# Patient Record
Sex: FEMALE | Race: ASIAN | Employment: FULL TIME | URBAN - METROPOLITAN AREA
[De-identification: names, ages, dates, MRNs, and addresses within clinical notes are randomized per-mention and may not be internally consistent; named-entity substitution may affect disease eponyms.]

---

## 2017-10-19 ENCOUNTER — OFFICE VISIT (OUTPATIENT)
Dept: ENDOCRINOLOGY | Age: 57
End: 2017-10-19

## 2017-10-19 VITALS
HEART RATE: 81 BPM | BODY MASS INDEX: 20.73 KG/M2 | DIASTOLIC BLOOD PRESSURE: 74 MMHG | HEIGHT: 63 IN | OXYGEN SATURATION: 99 % | WEIGHT: 117 LBS | RESPIRATION RATE: 16 BRPM | SYSTOLIC BLOOD PRESSURE: 99 MMHG

## 2017-10-19 DIAGNOSIS — E78.2 MIXED HYPERLIPIDEMIA: ICD-10-CM

## 2017-10-19 DIAGNOSIS — Z79.4 TYPE 2 DIABETES MELLITUS WITH BOTH EYES AFFECTED BY MODERATE NONPROLIFERATIVE RETINOPATHY WITHOUT MACULAR EDEMA, WITH LONG-TERM CURRENT USE OF INSULIN (HCC): Primary | ICD-10-CM

## 2017-10-19 DIAGNOSIS — E11.3393 TYPE 2 DIABETES MELLITUS WITH BOTH EYES AFFECTED BY MODERATE NONPROLIFERATIVE RETINOPATHY WITHOUT MACULAR EDEMA, WITH LONG-TERM CURRENT USE OF INSULIN (HCC): Primary | ICD-10-CM

## 2017-10-19 RX ORDER — LISINOPRIL 2.5 MG/1
TABLET ORAL
COMMUNITY
Start: 2017-10-06 | End: 2019-01-08 | Stop reason: SDUPTHER

## 2017-10-19 RX ORDER — METFORMIN HYDROCHLORIDE 1000 MG/1
TABLET ORAL
COMMUNITY
Start: 2017-10-18 | End: 2017-10-19 | Stop reason: SDUPTHER

## 2017-10-19 RX ORDER — METFORMIN HYDROCHLORIDE 1000 MG/1
1000 TABLET ORAL 2 TIMES DAILY WITH MEALS
Qty: 60 TAB | Refills: 11 | Status: SHIPPED | OUTPATIENT
Start: 2017-10-19 | End: 2018-04-02 | Stop reason: SDUPTHER

## 2017-10-19 RX ORDER — PEN NEEDLE, DIABETIC 30 GX3/16"
NEEDLE, DISPOSABLE MISCELLANEOUS DAILY
Qty: 1 PACKAGE | Refills: 11 | Status: SHIPPED | OUTPATIENT
Start: 2017-10-19 | End: 2021-11-18

## 2017-10-19 RX ORDER — INSULIN GLARGINE 100 [IU]/ML
INJECTION, SOLUTION SUBCUTANEOUS
Qty: 1 PEN | Refills: 0 | Status: SHIPPED | COMMUNITY
Start: 2017-10-19 | End: 2017-10-19 | Stop reason: SDUPTHER

## 2017-10-19 RX ORDER — GLIPIZIDE 5 MG/1
TABLET, FILM COATED, EXTENDED RELEASE ORAL
COMMUNITY
Start: 2017-09-17 | End: 2018-05-18 | Stop reason: SDUPTHER

## 2017-10-19 RX ORDER — ATORVASTATIN CALCIUM 40 MG/1
TABLET, FILM COATED ORAL
COMMUNITY
Start: 2017-10-18 | End: 2018-05-18 | Stop reason: SDUPTHER

## 2017-10-19 RX ORDER — INSULIN GLARGINE 100 [IU]/ML
INJECTION, SOLUTION SUBCUTANEOUS
Qty: 5 PEN | Refills: 2 | Status: SHIPPED | OUTPATIENT
Start: 2017-10-19 | End: 2018-05-18 | Stop reason: SDUPTHER

## 2017-10-19 NOTE — PROGRESS NOTES
Endocrinology New Patient Visit    Chief Complaint: Type 2 diabetes, uncontrolled    Referring provider: ANJALI Nation ([facility])  Primary Care Provider: Raiza Clemons MD    History of Present Illness: Regina Escobar is a 56 y.o. female who was referred for evaluation of uncontrolled type 2 diabetes mellitus with last A1c 10.3% in July. She was diagnosed with diabetes two years ago. She has only been on oral medications. Current glycemic medication regimen is metformin 500mg BID, glucotrol XL 5mg daily, and Tradjenta 5mg daily. Home blood glucose monitoring frequency: not often, has a meter but doesn't use it. Fasting POC glucose this morning is 238 . The patient has not had symptoms of hypoglycemia that she knows of. Known complications include moderate nonproliferative retinopathy. Last eye exam was in Sept 2016. Labs in July were notable for total chol 197, , , HDL 49; normal renal function (), normal AST/ALT. Takes atorvastatin and lisinopril. She has attended diabetes education but does not necessarily try to restrict dietary carbohydrate intake, eats a lot of bread and rice. Eats 3 meals/day and does not snack often. Only drinks water.  Typical meals are as follows:  Breakfast: bagel or bread  Lunch and Dinner: rice with meat and vegetable    Review of Systems   General ROS: negative  Ophthalmic ROS: positive for - decreased vision  ENT ROS: negative  Endocrine ROS: negative for - polydipsia/polyuria  Respiratory ROS: no cough, shortness of breath, or wheezing  Cardiovascular ROS: no chest pain or dyspnea on exertion  Gastrointestinal ROS: no abdominal pain, change in bowel habits, or black or bloody stools  Genito-Urinary ROS: no dysuria, trouble voiding, or hematuria  Musculoskeletal ROS: negative  Neurological ROS: positive for - numbness/tingling  Dermatological ROS: negative    Problem List:  Patient Active Problem List   Diagnosis Code    [facility] E11.9 Past Medical History:  Past Medical History:   Diagnosis Date    Diabetes ([facility]     Hyperlipidemia        Past Surgical History:  History reviewed. No pertinent surgical history. Social History:  Social History     Social History    Marital status:      Spouse name: N/A    Number of children: N/A    Years of education: N/A     Occupational History    Not on file. Social History Main Topics    Smoking status: Never Smoker    Smokeless tobacco: Never Used    Alcohol use No    Drug use: No    Sexual activity: Not on file     Other Topics Concern    Not on file     Social History Narrative    No narrative on file       Family History:  No family history on file. Medications:     Current Outpatient Prescriptions:     atorvastatin (LIPITOR) 40 mg tablet, , Disp: , Rfl:     glipiZIDE SR (GLUCOTROL XL) 5 mg CR tablet, , Disp: , Rfl:     metFORMIN (GLUCOPHAGE) 1,000 mg tablet, , Disp: , Rfl:     lisinopril (PRINIVIL, ZESTRIL) 2.5 mg tablet, , Disp: , Rfl:     Allergies:  No Known Allergies    Physical Examination:  Blood pressure 99/74, pulse 81, resp. rate 16, height 5' 3\" (1.6 m), weight 117 lb (53.1 kg), SpO2 99 %. Gen: no acute distress  HEENT: mucous membranes moist, normocephalic, non traumatic  Thyroid: no enlargement or nodules noted  CAD: normal rate, regular rhythm. No murmur rubs or gallops  PULM: clear to ausculation, no wheezes, rhonchis or rales. GI: soft non tender, non distended. EXT: no clubbing, cyanosis or edema  Neuro: grossly non focal, normal DTRs  Psych: pleasant, good insight into medical hx  Skin: warm, dry      Clinical Data Review: There are no results available in [facility]. Pertinent lab results from outside records are transcribed in HPI and scanned into the medical record. Assessment and Plan:  Patient is a 56 y.o. female here for uncontrolled type 2 diabetes on three oral agents.  Given the degree of A1c elevation and her dx of moderate nonproliferative retinopathy, I recommend adding basal insulin today. I personally taught her how to inject with an insulin pen and she administered her first shot in clinic today. Once blood sugars are better controlled, I will probably switch her sulfonylurea to SGLT2i, but would prefer that A1c be under 9% first. We also discussed the importance of good blood pressure and glycemic control to further reduce the risks of microvascular and macrovascular complications. We discussed how to recognize and treat hypoglycemia. She will notify me in between appointments for hypoglycemia or persistent hyperglycemia and has my contact information. 1. 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 Mixed hyperlipidemia       Glycemic Medication Changes:  - start Lantus 12 units Qam  - increase metformin to 1000mg BID  - continue Tradjenta and glipizide XL  - check BG 2x/day  - encouraged her to purchase glucose tabs OTC  - provided detailed written instructions     DM Health Maintenance: pertinent items updated in HM tab  A1c: repeat at next visit  Cv/Lipids: on atorvastatin, lipids UTD  BP/Renal: BP at goal, on ACEi, microalbumin: check next visit  Vaccines: per PCP  Podiatry: no active issues, encouraged well-fitting footwear and daily inspection  Neuro: update foot exam at next visit  Ophtho: yearly eye exam recommended - last visit Sept 2016, dx moderate nonproliferative retinopathy  Diet and exercise: discussed healthy eating and exercise recommendations, particularly reduction in dietary carbohydrates    I spent 60 minutes with the patient today and > 50% of the time was spent counseling the patient about diabetes management including medication options and dietary modification. Patient verbalized an understanding and will return to clinic in 3 months.  Thank you for the opportunity to participate in this patient's care.     Mark Jerome MD  [facility]  [facility]

## 2017-10-19 NOTE — MR AVS SNAPSHOT
Visit Information Date & Time Provider Department Dept. Phone Encounter #  
 10/19/2017  8:30 AM Nicole Bello, [facility] [phone] Upcoming Health Maintenance Date Due Hepatitis C Screening 1960 DTaP/Tdap/Td series (1 - Tdap) 2/1/1983 PAP AKA CERVICAL CYTOLOGY 2/1/1983 BREAST CANCER SCRN MAMMOGRAM 2/1/2012 FOBT Q 1 YEAR AGE 50-75 2/1/2012 INFLUENZA AGE 9 TO ADULT 8/1/2017 Allergies as of 10/19/2017  Review Complete On: 10/19/2017 By: Trini Cohn No Known Allergies Current Immunizations  Never Reviewed No immunizations on file. Not reviewed this visit Vitals BP Pulse Resp Height(growth percentile) Weight(growth percentile) SpO2  
 99/74 81 16 5' 3\" (1.6 m) 117 lb (53.1 kg) 99% BMI OB Status Smoking Status 20.73 kg/m2 Postmenopausal Never Smoker Vitals History BMI and BSA Data Body Mass Index Body Surface Area 20.73 kg/m 2 1.54 m 2 Preferred Pharmacy Pharmacy Name Phone [facility] [address] [place] [address] Your Updated Medication List  
  
   
This list is accurate as of: 10/19/17  9:20 AM.  Always use your most recent med list.  
  
  
  
  
 atorvastatin 40 mg tablet Commonly known as:  LIPITOR  
  
 glipiZIDE SR 5 mg CR tablet Commonly known as:  GLUCOTROL XL  
  
 insulin glargine 100 unit/mL (3 mL) Inpn Commonly known as:  LANTUS SOLOSTAR Inject 12 units daily Insulin Needles (Disposable) 31 gauge x 5/16\" Ndle  
by SubCUTAneous route daily. lisinopril 2.5 mg tablet Commonly known as:  PRINIVIL, ZESTRIL  
  
 metFORMIN 1,000 mg tablet Commonly known as:  GLUCOPHAGE Take 1 Tab by mouth two (2) times daily (with meals). TRADJENTA 5 mg tablet Generic drug:  linagliptin Take 5 mg by mouth daily. Prescriptions Sent to Pharmacy Refills insulin glargine (LANTUS SOLOSTAR) 100 unit/mL (3 mL) inpn 2 Sig: Inject 12 units daily Class: Normal  
 Pharmacy: [address] [place] [address],[address], [facility] #: [phone] Insulin Needles, Disposable, 31 gauge x 5/16\" ndle 11 Sig: by SubCUTAneous route daily. Class: Normal  
 Pharmacy: [address] [place] [address],[address], [facility]  Ph #: [phone] Route: SubCUTAneous  
 metFORMIN (GLUCOPHAGE) 1,000 mg tablet 11 Sig: Take 1 Tab by mouth two (2) times daily (with meals). Class: Normal  
 Pharmacy: [address] [place] [address],[address], [facility]  #: [phone] Route: Oral  
  
Patient Instructions Diabetes Instructions: 
- start LANTUS INSULIN 12 units every morning 
- take metformin 1000mg twice a day 
- take glipizide XL once a day 
- take Tradjenta once a day Check blood sugar at least TWICE a day: every morning when you wake up and at bedtime. Keep a record of your values and bring this or your glucometer with you to your next visit. Goal blood sugars: 80 to 180 Notify me if you have blood sugars less than 80 Diet instructions: 
Reduce the amount of carbohydrates in your diet. This means not just avoiding sweets, sodas, or desserts but also reducing the amount of bread, pasta, rice, potatoes, corn, and crackers that you eat. Do not have more than one serving of carbs per meal (for example: do not eat a sandwich and potato chips in the same meal). Focus on eating mostly protein (meat, poultry, fish, shellfish, eggs), healthy fats (avocados, nuts, cheese, olive or coconut oil) and non-starchy vegetables (greens, carrots, tomatoes, bell peppers, broccoli, brussels sprouts, green beans, etc). If you fill yourself up with these foods, you won't even want the carbs. Introducing [facility] & HEALTH SERVICES!    
 [address] [place] [address] introduces MyChart patient portal. Now you can access parts of your medical record, email your doctor's office, and request medication refills online. 1. In your internet browser, go to [email or web address] 2. Click on the First Time User? Click Here link in the Sign In box. You will see the New Member Sign Up page. 3. Enter your [employer] Access Code exactly as it appears below. You will not need to use this code after youve completed the sign-up process. If you do not sign up before the expiration date, you must request a new code. · [employer] Access Code: [email or web address] Expires: 1/17/2018  9:15 AM 
 
4. Enter the last four digits of your Social Security Number (xxxx) and Date of Birth (mm/dd/yyyy) as indicated and click Submit. You will be taken to the next sign-up page. 5. Create a [employer] ID. This will be your [employer] login ID and cannot be changed, so think of one that is secure and easy to remember. 6. Create a [employer] password. You can change your password at any time. 7. Enter your Password Reset Question and Answer. This can be used at a later time if you forget your password. 8. Enter your e-mail address. You will receive e-mail notification when new information is available in [address] 9. Click Sign Up. You can now view and download portions of your medical record. 10. Click the Download Summary menu link to download a portable copy of your medical information. If you have questions, please visit the Frequently Asked Questions section of the [employer] website. Remember, [employer] is NOT to be used for urgent needs. For medical emergencies, dial 911. Now available from your iPhone and Android! Please provide this summary of care documentation to your next provider. Your primary care clinician is listed as Jamas Hipps. If you have any questions after today's visit, please call [phone].

## 2018-01-26 ENCOUNTER — OFFICE VISIT (OUTPATIENT)
Dept: ENDOCRINOLOGY | Age: 58
End: 2018-01-26

## 2018-01-26 VITALS
OXYGEN SATURATION: 99 % | SYSTOLIC BLOOD PRESSURE: 111 MMHG | HEIGHT: 63 IN | DIASTOLIC BLOOD PRESSURE: 71 MMHG | WEIGHT: 103.6 LBS | BODY MASS INDEX: 18.36 KG/M2 | RESPIRATION RATE: 16 BRPM | HEART RATE: 86 BPM

## 2018-01-26 DIAGNOSIS — E78.2 MIXED HYPERLIPIDEMIA: ICD-10-CM

## 2018-01-26 DIAGNOSIS — Z79.4 TYPE 2 DIABETES MELLITUS WITH BOTH EYES AFFECTED BY MODERATE NONPROLIFERATIVE RETINOPATHY WITHOUT MACULAR EDEMA, WITH LONG-TERM CURRENT USE OF INSULIN (HCC): Primary | ICD-10-CM

## 2018-01-26 DIAGNOSIS — E11.3393 TYPE 2 DIABETES MELLITUS WITH BOTH EYES AFFECTED BY MODERATE NONPROLIFERATIVE RETINOPATHY WITHOUT MACULAR EDEMA, WITH LONG-TERM CURRENT USE OF INSULIN (HCC): Primary | ICD-10-CM

## 2018-01-26 RX ORDER — ALBUTEROL SULFATE 90 UG/1
AEROSOL, METERED RESPIRATORY (INHALATION)
COMMUNITY
Start: 2018-01-19 | End: 2021-11-18

## 2018-01-26 RX ORDER — METHYLPREDNISOLONE 4 MG/1
TABLET ORAL
COMMUNITY
Start: 2018-01-19 | End: 2018-01-26 | Stop reason: ALTCHOICE

## 2018-01-26 NOTE — MR AVS SNAPSHOT
[address] [place] [address] 
[phone] Patient: Susan Montoya MRN: [id number] IMB:[birth year] Visit Information Date & Time Provider Department Dept. Phone Encounter #  
 1/26/2018 10:10 AM Berta Sawyer MD [facility] [phone] Follow-up Instructions Return in about 4 months (around 5/26/2018). Upcoming Health Maintenance Date Due Hepatitis C Screening 1960 HEMOGLOBIN A1C Q6M 1962 LIPID PANEL Q1 1962 FOOT EXAM Q1 2/1/1972 MICROALBUMIN Q1 2/1/1972 Pneumococcal 19-64 Medium Risk (1 of 1 - PPSV23) 2/1/1981 DTaP/Tdap/Td series (1 - Tdap) 2/1/1983 PAP AKA CERVICAL CYTOLOGY 2/1/1983 BREAST CANCER SCRN MAMMOGRAM 2/1/2012 FOBT Q 1 YEAR AGE 50-75 2/1/2012 Influenza Age 5 to Adult 8/1/2017 EYE EXAM RETINAL OR DILATED Q1 10/19/2018 Allergies as of 1/26/2018  Review Complete On: 1/26/2018 By: Alfredo Villavicencio No Known Allergies Current Immunizations  Never Reviewed No immunizations on file. Not reviewed this visit You Were Diagnosed With   
  
 Codes Comments Type 2 diabetes mellitus with both eyes affected by moderate nonproliferative retinopathy without macular edema, with long-term current use of insulin ([facility]    -  Primary ICD-10-CM: U12.4676, Z79.4 ICD-9-CM: 250.50, 362.05, V58.67 Mixed hyperlipidemia     ICD-10-CM: E78.2 ICD-9-CM: 272.2 Vitals BP Pulse Resp Height(growth percentile) Weight(growth percentile) SpO2  
 111/71 86 16 5' 3\" (1.6 m) 103 lb 9.6 oz (47 kg) 99% BMI OB Status Smoking Status 18.35 kg/m2 Postmenopausal Never Smoker Vitals History BMI and BSA Data Body Mass Index Body Surface Area  
 18.35 kg/m 2 1.45 m 2 Preferred Pharmacy Pharmacy Name Phone [address] [facility], [address] [place] [address]  [phone] Your Updated Medication List  
  
   
This list is accurate as of: 1/26/18 10:27 AM.  Always use your most recent med list.  
  
  
  
  
 atorvastatin 40 mg tablet Commonly known as:  LIPITOR  
  
 glipiZIDE SR 5 mg CR tablet Commonly known as:  GLUCOTROL XL  
  
 glucose blood VI test strips strip Commonly known as:  Kenia Kong Check BG 2-3 times/day  
  
 insulin glargine 100 unit/mL (3 mL) Inpn Commonly known as:  LANTUS SOLOSTAR Inject 12 units daily Insulin Needles (Disposable) 31 gauge x 5/16\" Ndle  
by SubCUTAneous route daily. linagliptin 5 mg tablet Commonly known as:  Osei Burow Take 1 Tab by mouth daily. lisinopril 2.5 mg tablet Commonly known as:  PRINIVIL, ZESTRIL  
  
 metFORMIN 1,000 mg tablet Commonly known as:  GLUCOPHAGE Take 1 Tab by mouth two (2) times daily (with meals). PROAIR HFA 90 mcg/actuation inhaler Generic drug:  albuterol Prescriptions Sent to Pharmacy Refills  
 glucose blood VI test strips (ONETOUCH VERIO) strip 11 Sig: Check BG 2-3 times/day Class: Normal  
 Pharmacy: [address] [place] [address] [address], [facility] Ph #: [phone]  
 linagliptin (TRADJENTA) 5 mg tablet 5 Sig: Take 1 Tab by mouth daily. Class: Normal  
 Pharmacy: [address] [place] [address] [address] [facility], [facility] Ph #: [phone] Route: Oral  
  
We Performed the Following BASIC METABOLIC PANEL (7) [06674 CPT(R)] HEMOGLOBIN A1C WITH EAG [68958 CPT(R)]  DIABETES FOOT EXAM [7 Custom] LIPID PANEL [91980 CPT(R)] MICROALBUMIN, UR, RAND W/ MICROALBUMIN/CREA RATIO A5737527 CPT(R)] Follow-up Instructions Return in about 4 months (around 5/26/2018). Patient Instructions Diabetes Instructions: 
- take LANTUS INSULIN 10 units every morning 
- take metformin 1000mg twice a day 
- take glipizide XL once a day 
- take Tradjenta 5mg once a day Check blood sugar at least TWICE a day: every morning when you wake up and at bedtime. Keep a record of your values and bring this or your glucometer with you to your next visit. Goal blood sugars: 80 to 180 Notify me if you have blood sugars less than 80 Diet instructions: 
Reduce the amount of carbohydrates in your diet. This means not just avoiding sweets, sodas, or desserts but also reducing the amount of bread, pasta, rice, potatoes, corn, and crackers that you eat. Do not have more than one serving of carbs per meal (for example: do not eat a sandwich and potato chips in the same meal). Focus on eating mostly protein (meat, poultry, fish, shellfish, eggs), healthy fats (avocados, nuts, cheese, olive or coconut oil) and non-starchy vegetables (greens, carrots, tomatoes, bell peppers, broccoli, brussels sprouts, green beans, etc). If you fill yourself up with these foods, you won't even want the carbs. Introducing [facility] & HEALTH SERVICES! Guy Daniels introduces [employer] patient portal. Now you can access parts of your medical record, email your doctor's office, and request medication refills online. 1. In your internet browser, go to [email or web address] 2. Click on the First Time User? Click Here link in the Sign In box. You will see the New Member Sign Up page. 3. Enter your [employer] Access Code exactly as it appears below. You will not need to use this code after youve completed the sign-up process. If you do not sign up before the expiration date, you must request a new code. · [employer] Access Code: [email or web address] Expires: 4/26/2018 10:26 AM 
 
4. Enter the last four digits of your Social Security Number (xxxx) and Date of Birth (mm/dd/yyyy) as indicated and click Submit. You will be taken to the next sign-up page. 5. Create a [employer] ID. This will be your [employer] login ID and cannot be changed, so think of one that is secure and easy to remember. 6. Create a [employer] password. You can change your password at any time. 7. Enter your Password Reset Question and Answer. This can be used at a later time if you forget your password. 8. Enter your e-mail address. You will receive e-mail notification when new information is available in [address] 9. Click Sign Up. You can now view and download portions of your medical record. 10. Click the Download Summary menu link to download a portable copy of your medical information. If you have questions, please visit the Frequently Asked Questions section of the [employer] website. Remember, [employer] is NOT to be used for urgent needs. For medical emergencies, dial 911. Now available from your iPhone and Android! Please provide this summary of care documentation to your next provider. Your primary care clinician is listed as Sabas Li. If you have any questions after today's visit, please call [phone].

## 2018-01-26 NOTE — PROGRESS NOTES
Endocrinology Visit    Chief Complaint: Type 2 diabetes    History of Present Illness: Gabbi Waters is a 56 y.o. female who returns for uncontrolled type 2 diabetes mellitus with last A1c 10.3% in July. I saw her in initial consultation in October at which time I added basal insulin to her oral medications. In the interim, she reports improvement in blood sugars initially but has been unable to check lately because her meter is not working and she ran out of test strips. She has also been sick with the flu for the past week - symptoms are resolving and she denies fevers/chills. Weight is down 14 lbs. Current glycemic medication regimen is metformin 500mg BID, glucotrol XL 5mg daily, and Lantus 12 units daily. She stopped taking Lantus for a period of time because a friend told her it would harm her liver. She has been taking it on a daily basis recently but did not take it this morning. Also ran out of 1010data so has not been taking this for the past month or so. Home blood glucose monitoring frequency: was checking 1-2 times/day in Nov. Brings in log with values that range from 66-200s, avg appears to be around 170 (see scanned in log). POC glucose this morning is 143 (ate breakfast consisting of an egg and rice about 4 hours ago). Denies hypoglycemia. Known complications include moderate nonproliferative retinopathy. Last eye exam was in Oct 2017. Labs in July 2017 were notable for total chol 197, , , HDL 49; normal renal function (), normal AST/ALT. Takes atorvastatin and lisinopril. She has attended diabetes education but does not necessarily try to restrict dietary carbohydrate intake, eats a lot of bread and rice. Eats 3 meals/day and does not snack often. Only drinks water.  Typical meals are as follows:  Breakfast: bagel or bread, eggs and rice  Lunch and Dinner: rice with meat and vegetables    Review of Systems as above, otherwise a 7 pt review is negative    Problem List:  Patient Active Problem List   Diagnosis Code    Diabetes ([facility] E11.9    Mixed hyperlipidemia E78.2       Past Medical History:    Past Medical History:   Diagnosis Date    Diabetes ([facility]     Hyperlipidemia        Past Surgical History:  History reviewed. No pertinent surgical history. Social History:  Social History     Social History    Marital status:      Spouse name: N/A    Number of children: N/A    Years of education: N/A     Occupational History    Not on file. Social History Main Topics    Smoking status: Never Smoker    Smokeless tobacco: Never Used    Alcohol use No    Drug use: No    Sexual activity: Not on file     Other Topics Concern    Not on file     Social History Narrative       Family History:  No family history on file. Medications:     Current Outpatient Prescriptions:     PROAIR HFA 90 mcg/actuation inhaler, , Disp: , Rfl:     atorvastatin (LIPITOR) 40 mg tablet, , Disp: , Rfl:     lisinopril (PRINIVIL, ZESTRIL) 2.5 mg tablet, , Disp: , Rfl:     glipiZIDE SR (GLUCOTROL XL) 5 mg CR tablet, , Disp: , Rfl:     insulin glargine (LANTUS SOLOSTAR) 100 unit/mL (3 mL) inpn, Inject 12 units daily, Disp: 5 Pen, Rfl: 2    Insulin Needles, Disposable, 31 gauge x 5/16\" ndle, by SubCUTAneous route daily. , Disp: 1 Package, Rfl: 11    metFORMIN (GLUCOPHAGE) 1,000 mg tablet, Take 1 Tab by mouth two (2) times daily (with meals). , Disp: 60 Tab, Rfl: 11    methylPREDNISolone (MEDROL DOSEPACK) 4 mg tablet, , Disp: , Rfl:     linagliptin (TRADJENTA) 5 mg tablet, Take 5 mg by mouth daily. , Disp: , Rfl:     Allergies:  No Known Allergies    Physical Examination:  Blood pressure 111/71, pulse 86, resp. rate 16, height 5' 3\" (1.6 m), weight 103 lb 9.6 oz (47 kg), SpO2 99 %. Gen: no acute distress  HEENT: mucous membranes moist, normocephalic, non traumatic  Thyroid: no enlargement or nodules noted  CAD: normal rate, regular rhythm.  No murmur rubs or gallops  PULM: clear to ausculation, no wheezes, rhonchis or rales. EXT: no edema, mild calluses on feet  Neuro: grossly non focal, normal DTRs  Psych: pleasant, good insight into medical hx  Skin: warm, dry    Diabetic foot exam:   Left: Filament test normal sensation with micro filament   Pulse DP: 2+ (normal)   Pulse PT: 2+ (normal)   Deformities: None  Right: Filament test normal sensation with micro filament   Pulse DP: 2+ (normal)   Pulse PT: 2+ (normal)   Deformities: None      Clinical Data Review: There are no results available in [facility]. Pertinent lab results from outside records are transcribed in [facility]PI and scanned into the medical record. Assessment and Plan:  Patient is a 56 y.o. female here for type 2 diabetes, control of which appears to be improving after adding basal insulin to her oral agents. She has not been on all four prescribed agents consistently for the past 1-2 months, also has not been able to self-monitor glucose values. Will resume DPP4i and reduce insulin dose slightly. Educated pt on appropriate daily administration of her medications.      Glycemic Medication Changes:  - decrease Lantus to 10 units Qam  - continue metformin 1000mg BID  - continue Tradjenta and glipizide XL  - check BG 2x/day  - provided detailed written instructions     DM Health Maintenance: pertinent items updated in HM tab  A1c: repeat today  Cv/Lipids: on atorvastatin, update lipid panel today  BP/Renal: BP at goal, on ACEi, microalbumin: check today  Vaccines: per PCP  Podiatry: no active issues, encouraged well-fitting footwear and daily inspection  Neuro: foot exam completed today - no evidence of neuropathy  Ophtho: yearly eye exam UTD - last visit Oct 2017, dx moderate nonproliferative retinopathy  Diet and exercise: discussed healthy eating and exercise recommendations, particularly reduction in dietary carbohydrates    I spent 25 minutes with the patient today and > 50% of the time was spent counseling the patient about diabetes management including medication options and dietary modification. Patient verbalized an understanding and will return to clinic in 4 months. Thank you for the opportunity to participate in this patient's care.     La Boss MD  [facility]  [facility]

## 2018-01-26 NOTE — PATIENT INSTRUCTIONS
Diabetes Instructions:  - take LANTUS INSULIN 10 units every morning  - take metformin 1000mg twice a day  - take glipizide XL once a day  - take Tradjenta 5mg once a day    Check blood sugar at least TWICE a day: every morning when you wake up and at bedtime. Keep a record of your values and bring this or your glucometer with you to your next visit. Goal blood sugars: 80 to 180  Notify me if you have blood sugars less than 80      Diet instructions:  Reduce the amount of carbohydrates in your diet. This means not just avoiding sweets, sodas, or desserts but also reducing the amount of bread, pasta, rice, potatoes, corn, and crackers that you eat. Do not have more than one serving of carbs per meal (for example: do not eat a sandwich and potato chips in the same meal). Focus on eating mostly protein (meat, poultry, fish, shellfish, eggs), healthy fats (avocados, nuts, cheese, olive or coconut oil) and non-starchy vegetables (greens, carrots, tomatoes, bell peppers, broccoli, brussels sprouts, green beans, etc). If you fill yourself up with these foods, you won't even want the carbs.

## 2018-04-02 RX ORDER — METFORMIN HYDROCHLORIDE 1000 MG/1
1000 TABLET ORAL 2 TIMES DAILY WITH MEALS
Qty: 180 TAB | Refills: 1 | Status: SHIPPED | OUTPATIENT
Start: 2018-04-02 | End: 2018-05-18 | Stop reason: SDUPTHER

## 2018-05-18 ENCOUNTER — OFFICE VISIT (OUTPATIENT)
Dept: ENDOCRINOLOGY | Age: 58
End: 2018-05-18

## 2018-05-18 VITALS
BODY MASS INDEX: 19.1 KG/M2 | SYSTOLIC BLOOD PRESSURE: 125 MMHG | HEIGHT: 63 IN | HEART RATE: 92 BPM | WEIGHT: 107.8 LBS | DIASTOLIC BLOOD PRESSURE: 74 MMHG

## 2018-05-18 DIAGNOSIS — E11.9 TYPE 2 DIABETES MELLITUS WITHOUT COMPLICATION, WITH LONG-TERM CURRENT USE OF INSULIN (HCC): Primary | ICD-10-CM

## 2018-05-18 DIAGNOSIS — Z79.4 TYPE 2 DIABETES MELLITUS WITHOUT COMPLICATION, WITH LONG-TERM CURRENT USE OF INSULIN (HCC): Primary | ICD-10-CM

## 2018-05-18 DIAGNOSIS — E78.2 MIXED HYPERLIPIDEMIA: ICD-10-CM

## 2018-05-18 RX ORDER — GLIPIZIDE 5 MG/1
5 TABLET, FILM COATED, EXTENDED RELEASE ORAL DAILY
Qty: 90 TAB | Refills: 1 | Status: SHIPPED | OUTPATIENT
Start: 2018-05-18 | End: 2019-01-04

## 2018-05-18 RX ORDER — METFORMIN HYDROCHLORIDE 1000 MG/1
1000 TABLET ORAL 2 TIMES DAILY WITH MEALS
Qty: 180 TAB | Refills: 1 | Status: SHIPPED | OUTPATIENT
Start: 2018-05-18 | End: 2018-11-02 | Stop reason: SDUPTHER

## 2018-05-18 RX ORDER — ATORVASTATIN CALCIUM 40 MG/1
40 TABLET, FILM COATED ORAL DAILY
Qty: 90 TAB | Refills: 3 | Status: SHIPPED | OUTPATIENT
Start: 2018-05-18 | End: 2019-04-08 | Stop reason: SDUPTHER

## 2018-05-18 RX ORDER — INSULIN GLARGINE 100 [IU]/ML
INJECTION, SOLUTION SUBCUTANEOUS
Qty: 5 PEN | Refills: 2 | Status: SHIPPED | OUTPATIENT
Start: 2018-05-18 | End: 2018-12-31 | Stop reason: SDUPTHER

## 2018-05-18 NOTE — MR AVS SNAPSHOT
[address] [place] [address] [address] 
[phone] Patient: Nitin Mckeon MRN: [id number] LOB:[birth year] Visit Information Date & Time Provider Department Dept. Phone Encounter #  
 5/18/2018 10:10 AM MD [facility] [phone] [id number] Upcoming Health Maintenance Date Due Hepatitis C Screening 1960 HEMOGLOBIN A1C Q6M 1960 LIPID PANEL Q1 1962 MICROALBUMIN Q1 2/1/1972 Pneumococcal 19-64 Medium Risk (1 of 1 - PPSV23) 2/1/1981 DTaP/Tdap/Td series (1 - Tdap) 2/1/1983 PAP AKA CERVICAL CYTOLOGY 2/1/1983 BREAST CANCER SCRN MAMMOGRAM 2/1/2012 FOBT Q 1 YEAR AGE 50-75 2/1/2012 Influenza Age 5 to Adult 8/1/2018 EYE EXAM RETINAL OR DILATED Q1 10/19/2018 FOOT EXAM Q1 1/26/2019 Allergies as of 5/18/2018  Review Complete On: 5/18/2018 By: Sejal Lamar No Known Allergies Current Immunizations  Never Reviewed No immunizations on file. Not reviewed this visit You Were Diagnosed With   
  
 Codes Comments Type 2 diabetes mellitus without complication, with long-term current use of insulin (HCC)    -  Primary ICD-10-CM: E11.9, Z79.4 ICD-9-CM: 250.00, V58.67 Vitals BP Pulse Height(growth percentile) Weight(growth percentile) BMI OB Status 125/74 92 5' 3\" (1.6 m) 107 lb 12.8 oz (48.9 kg) 19.1 kg/m2 Postmenopausal  
 Smoking Status Never Smoker Vitals History BMI and BSA Data Body Mass Index Body Surface Area  
 19.1 kg/m 2 1.47 m 2 Preferred Pharmacy Pharmacy Name Phone [facility], [facility] [phone] Your Updated Medication List  
  
   
This list is accurate as of 5/18/18 10:45 AM.  Always use your most recent med list.  
  
  
  
  
 atorvastatin 40 mg tablet Commonly known as:  LIPITOR Take 1 Tab by mouth daily. glipiZIDE SR 5 mg CR tablet Commonly known as:  GLUCOTROL XL Take 1 Tab by mouth daily. glucose blood VI test strips strip Commonly known as:  Vicenta Bravo Check BG 2-3 times/day  
  
 insulin glargine 100 unit/mL (3 mL) Inpn Commonly known as:  LANTUS SOLOSTAR U-100 INSULIN Inject 10 units daily Insulin Needles (Disposable) 31 gauge x 5/16\" Ndle  
by SubCUTAneous route daily. linagliptin 5 mg tablet Commonly known as:  Perfecto Gibbs Take 1 Tab by mouth daily. lisinopril 2.5 mg tablet Commonly known as:  PRINIVIL, ZESTRIL  
  
 metFORMIN 1,000 mg tablet Commonly known as:  GLUCOPHAGE Take 1 Tab by mouth two (2) times daily (with meals). PROAIR HFA 90 mcg/actuation inhaler Generic drug:  albuterol Prescriptions Sent to Pharmacy Refills  
 linagliptin (TRADJENTA) 5 mg tablet 1 Sig: Take 1 Tab by mouth daily. Class: Normal  
 Pharmacy: [address], [address] Ph #: [phone] Route: Oral  
 metFORMIN (GLUCOPHAGE) 1,000 mg tablet 1 Sig: Take 1 Tab by mouth two (2) times daily (with meals). Class: Normal  
 Pharmacy: [address], [address] Ph #: [phone] Route: Oral  
 insulin glargine (LANTUS SOLOSTAR U-100 INSULIN) 100 unit/mL (3 mL) inpn 2 Sig: Inject 10 units daily Class: Normal  
 Pharmacy: [address] Ph #: [phone]  
 atorvastatin (LIPITOR) 40 mg tablet 3 Sig: Take 1 Tab by mouth daily. Class: Normal  
 Pharmacy: [address], [address] Ph #: [phone] Route: Oral  
 glipiZIDE SR (GLUCOTROL XL) 5 mg CR tablet 1 Sig: Take 1 Tab by mouth daily. Class: Normal  
 Pharmacy: [address], [address] Ph #: [phone] Route: Oral  
  
We Performed the Following BASIC METABOLIC PANEL (7) [75124 CPT(R)] HEMOGLOBIN A1C WITH EAG [04698 CPT(R)] HEMOGLOBIN A1C WITH EAG [45386 CPT(R)] LIPID PANEL [25612 CPT(R)] MICROALBUMIN, UR, RAND W/ MICROALB/CREAT RATIO [id number] CPT(R)] Introducing [facility]! [facility] introduces [employer] patient portal. Now you can access parts of your medical record, email your doctor's office, and request medication refills online. 1. In your internet browser, go to [email or web address] 2. Click on the First Time User? Click Here link in the Sign In box. You will see the New Member Sign Up page. 3. Enter your Legal [employer] Access Code exactly as it appears below. You will not need to use this code after youve completed the sign-up process. If you do not sign up before the expiration date, you must request a new code. · Legal [employer] Access Code: [email or web address] Expires: 8/16/2018 10:43 AM 
 
4. Enter the last four digits of your Social Security Number (xxxx) and Date of Birth (mm/dd/yyyy) as indicated and click Submit. You will be taken to the next sign-up page. 5. Create a Legal Egg ID. This will be your [employer] login ID and cannot be changed, so think of one that is secure and easy to remember. 6. Create a [employer] password. You can change your password at any time. 7. Enter your Password Reset Question and Answer. This can be used at a later time if you forget your password. 8. Enter your e-mail address. You will receive e-mail notification when new information is available in [address] 9. Click Sign Up. You can now view and download portions of your medical record. 10. Click the Download Summary menu link to download a portable copy of your medical information. If you have questions, please visit the Frequently Asked Questions section of the [employer] website. Remember, Legal Egg is NOT to be used for urgent needs. For medical emergencies, dial 911. Now available from your iPhone and Android! Please provide this summary of care documentation to your next provider. Your primary care clinician is listed as Candie Jaramillo. If you have any questions after today's visit, please call [phone].

## 2018-05-18 NOTE — PROGRESS NOTES
Endocrinology Visit    Chief Complaint: Type 2 diabetes    History of Present Illness: Breanne Ansari is a 66 y.o. female who returns for uncontrolled type 2 diabetes mellitus. I saw her in initial consultation in October 2017 at which time I added basal insulin to her oral medications. She has not had a follow-up A1c since then even though I asked her to do labs at her visit in January. Reports decent blood sugar control, except on days when she eats noodles. Current glycemic medication regimen is metformin 1000mg BID, glucotrol XL 5mg daily, Tradjenta 5mg daily, and Lantus 10 units Qam. Reports daily adherence. Home blood glucose monitoring frequency: was checking 1-2 times/day. Brings in log with values that range from , avg appears to be around 160 (see scanned in log). Glucose this morning was 100. Denies hypoglycemia. Known complications include moderate nonproliferative retinopathy. Last eye exam was in Oct 2017. Labs in July 2017 were notable for total chol 197, , , HDL 49; normal renal function (), normal AST/ALT. Takes atorvastatin and lisinopril. She has attended diabetes education and does try to restrict dietary carbohydrate intake, eats bread, noodles, and rice a few times per week though. Eats 3 meals/day and does not snack often. Only drinks water. Typical meals are as follows:  Breakfast: bagel or bread, eggs and rice  Lunch and Dinner: rice with meat and vegetables, sometimes soup with noodles    Review of Systems as above, otherwise a 7 pt review is negative    Problem List:  Patient Active Problem List   Diagnosis Code    Diabetes ([facility] E11.9    Mixed hyperlipidemia E78.2       Past Medical History:    Past Medical History:   Diagnosis Date    [facility]     Hyperlipidemia        Past Surgical History:  History reviewed. No pertinent surgical history.     Social History:  Social History     Social History    Marital status:      Spouse name: N/A  Number of children: N/A    Years of education: N/A     Occupational History    Not on file. Social History Main Topics    Smoking status: Never Smoker    Smokeless tobacco: Never Used    Alcohol use No    Drug use: No    Sexual activity: Not on file     Other Topics Concern    Not on file     Social History Narrative       Family History:  No family history on file. Medications:     Current Outpatient Prescriptions:     linagliptin (TRADJENTA) 5 mg tablet, Take 1 Tab by mouth daily. , Disp: 90 Tab, Rfl: 1    metFORMIN (GLUCOPHAGE) 1,000 mg tablet, Take 1 Tab by mouth two (2) times daily (with meals). , Disp: 180 Tab, Rfl: 1    PROAIR HFA 90 mcg/actuation inhaler, , Disp: , Rfl:     atorvastatin (LIPITOR) 40 mg tablet, , Disp: , Rfl:     lisinopril (PRINIVIL, ZESTRIL) 2.5 mg tablet, , Disp: , Rfl:     glipiZIDE SR (GLUCOTROL XL) 5 mg CR tablet, , Disp: , Rfl:     insulin glargine (LANTUS SOLOSTAR) 100 unit/mL (3 mL) inpn, Inject 12 units daily, Disp: 5 Pen, Rfl: 2    Insulin Needles, Disposable, 31 gauge x 5/16\" ndle, by SubCUTAneous route daily. , Disp: 1 Package, Rfl: 11    glucose blood VI test strips (ONETOUCH VERIO) strip, Check BG 2-3 times/day, Disp: 100 Strip, Rfl: 11    Allergies:  No Known Allergies    Physical Examination:  Blood pressure 125/74, pulse 92, height 5' 3\" (1.6 m), weight 107 lb 12.8 oz (48.9 kg). Gen: no acute distress  HEENT: mucous membranes moist  Thyroid: no enlargement or nodules noted  CAD: normal rate, regular rhythm. No murmur rubs or gallops  PULM: unlabored respirations  EXT: no edema, mild calluses on feet  Neuro: grossly non focal, normal DTRs  Psych: pleasant, good insight into medical hx  Skin: warm, dry      Clinical Data Review:     No results found for: HBA1C, HGBE8, UEW2BRZE     Assessment and Plan:  Patient is a 66 y.o. female here for type 2 diabetes, control of which appears to be improving after adding basal insulin to her oral agents.  Will verify with A1c today, goal is less than 7%. If she is not achieving glycemic control on this regimen, will switch sulfonylurea to SGLT2i at her next visit. Glycemic Medication Changes:  - continue Lantus to 10 units Qam  - continue metformin 1000mg BID  - continue Tradjenta and glipizide XL  - check BG 2x/day  - provided detailed written instructions     DM Health Maintenance: pertinent items updated in HM tab  A1c: update today  Cv/Lipids: on atorvastatin, update lipid panel today  BP/Renal: BP at goal, on ACEi, microalbumin: update today  Vaccines: per PCP  Podiatry: no active issues, encouraged well-fitting footwear and daily inspection  Neuro: foot exam UTD - no evidence of neuropathy  Ophtho: yearly eye exam UTD - last visit Oct 2017, dx moderate nonproliferative retinopathy  Diet and exercise: discussed healthy eating and exercise recommendations, particularly reduction in dietary carbohydrates    I spent 25 minutes with the patient today and > 50% of the time was spent counseling the patient about diabetes management including medication options and dietary modification. Patient verbalized an understanding and will return to clinic in 4 months. Thank you for the opportunity to participate in this patient's care.     Fabián Veloz MD  [facility]  [address] [place] [address]

## 2018-05-19 LAB
EST. AVERAGE GLUCOSE BLD GHB EST-MCNC: 137 MG/DL
HBA1C MFR BLD: 6.4 % (ref 4.8–5.6)

## 2018-08-09 RX ORDER — PEN NEEDLE, DIABETIC 31 GX3/16"
1 NEEDLE, DISPOSABLE MISCELLANEOUS DAILY
Qty: 100 PEN NEEDLE | Refills: 3 | Status: SHIPPED | OUTPATIENT
Start: 2018-08-09 | End: 2019-04-08 | Stop reason: SDUPTHER

## 2018-11-02 RX ORDER — METFORMIN HYDROCHLORIDE 1000 MG/1
TABLET ORAL
Qty: 180 TAB | Refills: 0 | Status: SHIPPED | OUTPATIENT
Start: 2018-11-02 | End: 2019-03-15 | Stop reason: SDUPTHER

## 2018-12-31 ENCOUNTER — TELEPHONE (OUTPATIENT)
Dept: ENDOCRINOLOGY | Age: 58
End: 2018-12-31

## 2018-12-31 RX ORDER — INSULIN GLARGINE 100 [IU]/ML
INJECTION, SOLUTION SUBCUTANEOUS
Qty: 5 PEN | Refills: 0 | Status: SHIPPED | OUTPATIENT
Start: 2018-12-31 | End: 2019-04-08 | Stop reason: SDUPTHER

## 2018-12-31 NOTE — TELEPHONE ENCOUNTER
Please let her know I sent a refill [facility]. I would recommend holding the glipizide for the next week until she sees Dr. Sascha Dillard and record her blood sugar readings so Dr. Sascha Dillard can see if she needs this or not.

## 2018-12-31 NOTE — TELEPHONE ENCOUNTER
I tired to make pt aware of the medication change, but when I called and I asked for pt, whomever answered the phone placed the phone down as if he was going to get the pt, but never returned back. I waited on the phone for 4 minutes, then I hung up. I later called back within 10 mins, but the line was busy. I waited an hour and called again, but the line remained busy.

## 2018-12-31 NOTE — TELEPHONE ENCOUNTER
12/31/2018  10:55 AM    Patient called in stating that the medication Glucotrol is making her have sweats and making her very tired. She also stated that she is running low on her insulin and needs a refill.  Patient is scheduled to see Morgan Crabtree on Jan 8, at 4:10 pm.        Thank you

## 2019-01-02 NOTE — TELEPHONE ENCOUNTER
Tried to reach pt again today, but was unsuccessful. I was also unable to leave a voice message due to her voicemail being full.

## 2019-01-04 NOTE — TELEPHONE ENCOUNTER
Called and was able to speak to the pt today and she stated that she loss her  last week so she was avoiding all calls. She then stated that she stopped the glipizide last week due to it causing her to sweat and was making her very jittery. Pt was asked whether or not she checked her bs while having the symptoms and she said no. I also asked how her bs are currently running since stopping the glipizide and she stated that they're running between 110-120. Pt was reminded of her appt next week and was encouraged to bring in her meter as well.

## 2019-01-08 ENCOUNTER — OFFICE VISIT (OUTPATIENT)
Dept: ENDOCRINOLOGY | Age: 59
End: 2019-01-08

## 2019-01-08 VITALS
DIASTOLIC BLOOD PRESSURE: 66 MMHG | HEIGHT: 63 IN | WEIGHT: 110.9 LBS | BODY MASS INDEX: 19.65 KG/M2 | SYSTOLIC BLOOD PRESSURE: 121 MMHG | OXYGEN SATURATION: 99 % | HEART RATE: 96 BPM | RESPIRATION RATE: 16 BRPM

## 2019-01-08 DIAGNOSIS — E78.2 MIXED HYPERLIPIDEMIA: ICD-10-CM

## 2019-01-08 DIAGNOSIS — I10 ESSENTIAL HYPERTENSION: ICD-10-CM

## 2019-01-08 DIAGNOSIS — E11.3393 TYPE 2 DIABETES MELLITUS WITH BOTH EYES AFFECTED BY MODERATE NONPROLIFERATIVE RETINOPATHY WITHOUT MACULAR EDEMA, WITH LONG-TERM CURRENT USE OF INSULIN (HCC): Primary | ICD-10-CM

## 2019-01-08 DIAGNOSIS — Z79.4 TYPE 2 DIABETES MELLITUS WITH BOTH EYES AFFECTED BY MODERATE NONPROLIFERATIVE RETINOPATHY WITHOUT MACULAR EDEMA, WITH LONG-TERM CURRENT USE OF INSULIN (HCC): Primary | ICD-10-CM

## 2019-01-08 RX ORDER — LISINOPRIL 2.5 MG/1
2.5 TABLET ORAL DAILY
Qty: 90 TAB | Refills: 3 | Status: SHIPPED | OUTPATIENT
Start: 2019-01-08 | End: 2021-08-19 | Stop reason: SDUPTHER

## 2019-01-08 NOTE — PROGRESS NOTES
Lab Results   Component Value Date/Time    Hemoglobin A1c 6.4 (H) 05/18/2018 11:07 AM       Diabetic Foot and Eye Exam HM Status   Topic Date Due    Diabetic Foot Care  05/18/2019    Eye Exam  10/19/2019

## 2019-01-08 NOTE — PROGRESS NOTES
Endocrinology Visit    Chief Complaint: Type 2 diabetes    History of Present Illness: Breanne Ansari is a 66 y.o. female who returns for uncontrolled type 2 diabetes mellitus. I saw her in initial consultation in October 2017 at which time I added basal insulin to her oral medications. She has not had a follow-up A1c since then even though I asked her to do labs at her last two visits. Reports decent blood sugar control, and had some dizziness when taking glipizide (but no documented hypoglycemia). She was instructed to stop taking it and the symptoms resolved. Her  passed two weeks zaidi. Current glycemic medication regimen is metformin 1000mg BID, Tradjenta 5mg daily, and Lantus 10 units Qam. Reports daily adherence. Forgot her meter but from memory, blood sugars have been in the 100s. It was 114 this morning, 160 now after eating an apple. 129 yesterday morning. She also c/o a swollen left ankle after turning it inward last week. Notices some floaters in her vision and wants to get an eye exam.     Known complications include moderate nonproliferative retinopathy. Last eye exam was in Oct 2017. Labs in July 2017 were notable for total chol 197, , , HDL 49; normal renal function (), normal AST/ALT. Takes atorvastatin and lisinopril. She has attended diabetes education and does try to restrict dietary carbohydrate intake, eats bread, noodles, and rice a few times per week though. Eats 3 meals/day and does not snack often. Only drinks water.  Typical meals are as follows:  Breakfast: bagel or bread, eggs and rice  Lunch and Dinner: rice with meat and vegetables, sometimes soup with noodles    Review of Systems as above, otherwise a 7 pt review is negative    Problem List:  Patient Active Problem List   Diagnosis Code    Diabetes ([address] E11.9    Mixed hyperlipidemia E78.2       Past Medical History:    Past Medical History:   Diagnosis Date    Diabetes ([address]     Hyperlipidemia Past Surgical History:  No past surgical history on file. Social History:  Social History     Socioeconomic History    Marital status:      Spouse name: Not on file    Number of children: Not on file    Years of education: Not on file    Highest education level: Not on file   Social Needs    Financial resource strain: Not on file    Food insecurity - worry: Not on file    Food insecurity - inability: Not on file   [employer] needs - medical: Not on file   [employer] needs - non-medical: Not on file   Occupational History    Not on file   Tobacco Use    Smoking status: Never Smoker    Smokeless tobacco: Never Used   Substance and Sexual Activity    Alcohol use: No    Drug use: No    Sexual activity: Not on file   Other Topics Concern    Not on file   Social History Narrative    Not on file       Family History:  No family history on file. Medications:     Current Outpatient Medications:     insulin glargine (LANTUS SOLOSTAR U-100 INSULIN) 100 unit/mL (3 mL) inpn, Inject 10 units daily, Disp: 5 Pen, Rfl: 0    metFORMIN (GLUCOPHAGE) 1,000 mg tablet, Take 1 tablet twice a day with meals--CALL [phone] FOR APPOINTMENT FOR MORE REFILLS, Disp: 180 Tab, Rfl: 0    linagliptin (TRADJENTA) 5 mg tablet, Take 1 Tab by mouth daily. , Disp: 90 Tab, Rfl: 1    atorvastatin (LIPITOR) 40 mg tablet, Take 1 Tab by mouth daily. , Disp: 90 Tab, Rfl: 3    glucose blood VI test strips (ONETOUCH VERIO) strip, Check BG 2-3 times/day, Disp: 100 Strip, Rfl: 11    lisinopril (PRINIVIL, ZESTRIL) 2.5 mg tablet, , Disp: , Rfl:     Insulin Needles, Disposable, 31 gauge x 5/16\" ndle, by SubCUTAneous route daily. , Disp: 1 Package, Rfl: 11    Insulin Needles, Disposable, (NUBIA PEN NEEDLE) 32 gauge x 5/32\" ndle, 1 Each by Does Not Apply route daily. , Disp: 100 Pen Needle, Rfl: 3    PROAIR HFA 90 mcg/actuation inhaler, , Disp: , Rfl:     Allergies:  No Known Allergies    Physical Examination:  Blood pressure 121/66, pulse 96, resp. rate 16, height 5' 3\" (1.6 m), weight 110 lb 14.4 oz (50.3 kg), SpO2 99 %. Gen: no acute distress  HEENT: mucous membranes moist  Thyroid: no enlargement or nodules noted  CAD: normal rate, regular rhythm. No murmur rubs or gallops  PULM: unlabored respirations  EXT: left ankle swollen and warm, no edema  Neuro: grossly non focal, normal DTRs  Psych: pleasant, good insight into medical hx  Skin: warm, dry      Clinical Data Review:     Lab Results   Component Value Date/Time    Hemoglobin A1c 6.4 (H) 05/18/2018 11:07 AM        Assessment and Plan:  Patient is a 66 y.o. female here for type 2 diabetes, control of which appears to be improving after adding basal insulin to her oral agents. Will verify with A1c today, goal is less than 7%. If she is not achieving glycemic control on this regimen, will add SGLT2i at her next visit. Glycemic Medication Changes:  - continue Lantus to 10 units Qam  - continue metformin 1000mg BID  - continue Tradjenta   - check BG 2x/day  - provided detailed written instructions     DM Health Maintenance: pertinent items updated in HM tab  A1c: update today  Cv/Lipids: on atorvastatin, update lipid panel today  BP/Renal: BP at goal, on ACEi, microalbumin: update today  Vaccines: per PCP  Podiatry: f/u with PCP re: ankle sprain  Neuro: foot exam UTD - no evidence of neuropathy  Ophtho: yearly eye exam needs to be updated (gave number for [facility]I) - last visit Oct 2017, dx moderate nonproliferative retinopathy  Diet and exercise: discussed healthy eating and exercise recommendations, particularly reduction in dietary carbohydrates    I spent 25 minutes with the patient today and > 50% of the time was spent counseling the patient about diabetes management including medication options and dietary modification. Patient verbalized an understanding and will return to clinic in 3 months. Thank you for the opportunity to participate in this patient's care.     Brittni Singh Kathleen Mckeon MD  [facility]  [facility]

## 2019-01-08 NOTE — PATIENT INSTRUCTIONS
Call [facility] for a diabetic eye exam: [phone]    See your primary care doctor about your ankle sprain    Continue Tradjenta, metformin, and Lantus  Goal blood sugars 80 to 180

## 2019-01-09 LAB
ALBUMIN SERPL-MCNC: 4.3 G/DL (ref 3.5–5.5)
ALBUMIN/CREAT UR: 177.5 MG/G CREAT (ref 0–30)
ALBUMIN/GLOB SERPL: 1.7 {RATIO} (ref 1.2–2.2)
ALP SERPL-CCNC: 74 IU/L (ref 39–117)
ALT SERPL-CCNC: 37 IU/L (ref 0–32)
AST SERPL-CCNC: 54 IU/L (ref 0–40)
BILIRUB SERPL-MCNC: 0.3 MG/DL (ref 0–1.2)
BUN SERPL-MCNC: 14 MG/DL (ref 6–24)
BUN/CREAT SERPL: 30 (ref 9–23)
CALCIUM SERPL-MCNC: 9.5 MG/DL (ref 8.7–10.2)
CHLORIDE SERPL-SCNC: 105 MMOL/L (ref 96–106)
CHOLEST SERPL-MCNC: 117 MG/DL (ref 100–199)
CO2 SERPL-SCNC: 20 MMOL/L (ref 20–29)
CREAT SERPL-MCNC: 0.46 MG/DL (ref 0.57–1)
CREAT UR-MCNC: 63.6 MG/DL
EST. AVERAGE GLUCOSE BLD GHB EST-MCNC: 157 MG/DL
GLOBULIN SER CALC-MCNC: 2.6 G/DL (ref 1.5–4.5)
GLUCOSE SERPL-MCNC: 128 MG/DL (ref 65–99)
HBA1C MFR BLD: 7.1 % (ref 4.8–5.6)
HDLC SERPL-MCNC: 59 MG/DL
LDLC SERPL CALC-MCNC: 49 MG/DL (ref 0–99)
MICROALBUMIN UR-MCNC: 112.9 UG/ML
POTASSIUM SERPL-SCNC: 4.9 MMOL/L (ref 3.5–5.2)
PROT SERPL-MCNC: 6.9 G/DL (ref 6–8.5)
SODIUM SERPL-SCNC: 141 MMOL/L (ref 134–144)
TRIGL SERPL-MCNC: 47 MG/DL (ref 0–149)
VLDLC SERPL CALC-MCNC: 9 MG/DL (ref 5–40)

## 2019-03-15 RX ORDER — METFORMIN HYDROCHLORIDE 1000 MG/1
TABLET ORAL
Qty: 180 TAB | Refills: 3 | Status: SHIPPED | OUTPATIENT
Start: 2019-03-15 | End: 2019-04-08 | Stop reason: SDUPTHER

## 2019-04-08 ENCOUNTER — OFFICE VISIT (OUTPATIENT)
Dept: ENDOCRINOLOGY | Age: 59
End: 2019-04-08

## 2019-04-08 ENCOUNTER — HOSPITAL ENCOUNTER (OUTPATIENT)
Dept: MAMMOGRAPHY | Age: 59
Discharge: HOME | End: 2019-04-08
Attending: FAMILY MEDICINE
Payer: COMMERCIAL

## 2019-04-08 VITALS
HEIGHT: 63 IN | BODY MASS INDEX: 19.07 KG/M2 | RESPIRATION RATE: 16 BRPM | DIASTOLIC BLOOD PRESSURE: 58 MMHG | OXYGEN SATURATION: 98 % | SYSTOLIC BLOOD PRESSURE: 104 MMHG | WEIGHT: 107.6 LBS | HEART RATE: 91 BPM

## 2019-04-08 DIAGNOSIS — I10 ESSENTIAL HYPERTENSION: ICD-10-CM

## 2019-04-08 DIAGNOSIS — E78.2 MIXED HYPERLIPIDEMIA: ICD-10-CM

## 2019-04-08 DIAGNOSIS — Z79.4 TYPE 2 DIABETES MELLITUS WITH BOTH EYES AFFECTED BY MODERATE NONPROLIFERATIVE RETINOPATHY WITHOUT MACULAR EDEMA, WITH LONG-TERM CURRENT USE OF INSULIN (HCC): Primary | ICD-10-CM

## 2019-04-08 DIAGNOSIS — E11.3393 TYPE 2 DIABETES MELLITUS WITH BOTH EYES AFFECTED BY MODERATE NONPROLIFERATIVE RETINOPATHY WITHOUT MACULAR EDEMA, WITH LONG-TERM CURRENT USE OF INSULIN (HCC): Primary | ICD-10-CM

## 2019-04-08 DIAGNOSIS — Z12.31 VISIT FOR SCREENING MAMMOGRAM: ICD-10-CM

## 2019-04-08 PROBLEM — E11.21 TYPE 2 DIABETES WITH NEPHROPATHY (HCC): Status: ACTIVE | Noted: 2019-04-08

## 2019-04-08 PROCEDURE — 77067 SCR MAMMO BI INCL CAD: CPT

## 2019-04-08 RX ORDER — ATORVASTATIN CALCIUM 40 MG/1
40 TABLET, FILM COATED ORAL DAILY
Qty: 90 TAB | Refills: 3 | Status: SHIPPED | OUTPATIENT
Start: 2019-04-08 | End: 2019-04-29 | Stop reason: SDUPTHER

## 2019-04-08 RX ORDER — PEN NEEDLE, DIABETIC 31 GX3/16"
1 NEEDLE, DISPOSABLE MISCELLANEOUS DAILY
Qty: 100 PEN NEEDLE | Refills: 3 | Status: SHIPPED | OUTPATIENT
Start: 2019-04-08 | End: 2021-11-19 | Stop reason: SDUPTHER

## 2019-04-08 RX ORDER — INSULIN GLARGINE 100 [IU]/ML
INJECTION, SOLUTION SUBCUTANEOUS
Qty: 5 PEN | Refills: 3 | Status: SHIPPED | OUTPATIENT
Start: 2019-04-08 | End: 2021-08-19

## 2019-04-08 RX ORDER — METFORMIN HYDROCHLORIDE 1000 MG/1
TABLET ORAL
Qty: 180 TAB | Refills: 3 | Status: SHIPPED | OUTPATIENT
Start: 2019-04-08 | End: 2021-08-19 | Stop reason: SDUPTHER

## 2019-04-08 NOTE — PROGRESS NOTES
Endocrinology Visit    Chief Complaint: Type 2 diabetes    History of Present Illness: Tosha Genao is a 64 y.o. female who returns for type 2 diabetes mellitus. I saw her in initial consultation in October 2017 at which time I added basal insulin to her oral medications. Last A1c was 7.1% in January. Current glycemic medication regimen is metformin 1000mg BID, Tradjenta 5mg daily, and Lantus 10 units Qam. Reports daily adherence. Forgot her meter but from memory, blood sugars have been in the low 100s. It was 100 this morning. Range is . Denies hypoglycemia, no sugars below 90. Known complications include moderate nonproliferative retinopathy and microalbuminuria. Last eye exam was in Oct 2017. Labs in July 2017 were notable for total chol 197, , , HDL 49; normal renal function, normal AST/ALT. Takes atorvastatin and lisinopril. She has attended diabetes education and does try to restrict dietary carbohydrate intake. She has decreased the amount of rice and noodles she eats, tries to eat mainly vegetables. Eats 3 meals/day and does not snack often. Only drinks water. Review of Systems as above, otherwise a 7 pt review is negative    Problem List:  Patient Active Problem List   Diagnosis Code    Diabetes ([facility] [address] E11.9    Mixed hyperlipidemia E78.2    Essential hypertension I10    Type 2 diabetes with nephropathy ([address] E11.21       Past Medical History:    Past Medical History:   Diagnosis Date    Diabetes ([facility] [address]     Hyperlipidemia        Past Surgical History:  No past surgical history on file.     Social History:  Social History     Socioeconomic History    Marital status:      Spouse name: Not on file    Number of children: Not on file    Years of education: Not on file    Highest education level: Not on file   Occupational History    Not on file   Social Needs    Financial resource strain: Not on file    Food insecurity:     Worry: Not on file     Inability: Not on file   [place] needs:     Medical: Not on file     Non-medical: Not on file   Tobacco Use    Smoking status: Never Smoker    Smokeless tobacco: Never Used   Substance and Sexual Activity    Alcohol use: No    Drug use: No    Sexual activity: Not on file   Lifestyle    Physical activity:     Days per week: Not on file     Minutes per session: Not on file    Stress: Not on file   Relationships    Social connections:     Talks on phone: Not on file     Gets together: Not on file     Attends Yars[employer] service: Not on file     Active member of club or organization: Not on file     Attends meetings of clubs or organizations: Not on file     Relationship status: Not on file    Intimate partner violence:     Fear of current or ex partner: Not on file     Emotionally abused: Not on file     Physically abused: Not on file     Forced sexual activity: Not on file   Other Topics Concern    Not on file   Social History Narrative    Not on file       Family History:  No family history on file. Medications:     Current Outpatient Medications:     metFORMIN (GLUCOPHAGE) 1,000 mg tablet, Take 1 tablet twice a day with meals, Disp: 180 Tab, Rfl: 3    linagliptin (TRADJENTA) 5 mg tablet, Take 1 Tab by mouth daily. , Disp: 90 Tab, Rfl: 3    lisinopril (PRINIVIL, ZESTRIL) 2.5 mg tablet, Take 1 Tab by mouth daily. , Disp: 90 Tab, Rfl: 3    insulin glargine (LANTUS SOLOSTAR U-100 INSULIN) 100 unit/mL (3 mL) inpn, Inject 10 units daily, Disp: 5 Pen, Rfl: 0    Insulin Needles, Disposable, (NUBIA PEN NEEDLE) 32 gauge x 5/32\" ndle, 1 Each by Does Not Apply route daily. , Disp: 100 Pen Needle, Rfl: 3    atorvastatin (LIPITOR) 40 mg tablet, Take 1 Tab by mouth daily. , Disp: 90 Tab, Rfl: 3    PROAIR HFA 90 mcg/actuation inhaler, , Disp: , Rfl:     Insulin Needles, Disposable, 31 gauge x 5/16\" ndle, by SubCUTAneous route daily. , Disp: 1 Package, Rfl: 11    glucose blood VI test strips (ONETOUCH VERIO) strip, Check BG 2-3 times/day, Disp: 100 Strip, Rfl: 11    Allergies:  No Known Allergies    Physical Examination:  Blood pressure 104/58, pulse 91, resp. rate 16, height 5' 3\" (1.6 m), weight 107 lb 9.6 oz (48.8 kg), SpO2 98 %. Gen: no acute distress  HEENT: mucous membranes moist  Thyroid: no enlargement or nodules noted  CAD: normal rate, regular rhythm. No murmur rubs or gallops  PULM: unlabored respirations  EXT: no edema  Neuro: grossly non focal, normal DTRs  Psych: pleasant, good insight into medical hx  Skin: warm, dry      Clinical Data Review:     Lab Results   Component Value Date/Time    Hemoglobin A1c 7.1 (H) 01/08/2019 04:23 PM    Hemoglobin A1c 6.4 (H) 05/18/2018 11:07 AM      Lab Results   Component Value Date/Time    Cholesterol, total 117 01/08/2019 04:23 PM    HDL Cholesterol 59 01/08/2019 04:23 PM    LDL, calculated 49 01/08/2019 04:23 PM    VLDL, calculated 9 01/08/2019 04:23 PM    Triglyceride 47 01/08/2019 04:23 PM     Lab Results   Component Value Date/Time    Microalb/Creat ratio (ug/mg creat.) 177.5 (H) 01/08/2019 04:23 PM      Lab Results   Component Value Date/Time    Sodium 141 01/08/2019 04:23 PM    Potassium 4.9 01/08/2019 04:23 PM    Chloride 105 01/08/2019 04:23 PM    CO2 20 01/08/2019 04:23 PM    Glucose 128 (H) 01/08/2019 04:23 PM    BUN 14 01/08/2019 04:23 PM    Creatinine 0.46 (L) 01/08/2019 04:23 PM    BUN/Creatinine ratio 30 (H) 01/08/2019 04:23 PM    GFR est  01/08/2019 04:23 PM    GFR est non- 01/08/2019 04:23 PM    Calcium 9.5 01/08/2019 04:23 PM      Assessment and Plan:  Patient is a 64 y.o. female here for type 2 diabetes, control of which has improved after adding basal insulin to her oral agents. Home glucose readings indicate adequate control but will verify with A1c today, goal is less than 7%. If she is not achieving glycemic control on this regimen, could add SGLT2i at her next visit.     Glycemic Medication Changes:  - continue Lantus 10 units Qam  - continue metformin 1000mg BID  - continue Tradjenta 5mg daily    DM Health Maintenance: pertinent items updated in HM tab  A1c: update today  Cv/Lipids: on atorvastatin, lipids UTD  BP/Renal: BP at goal, on ACEi, microalbumin UTD and elevated  Vaccines: per PCP  Neuro: foot exam UTD - no evidence of neuropathy  Ophtho: yearly eye exam needs to be updated (gave number for VEI) - last visit Oct 2017, dx moderate nonproliferative retinopathy  Diet and exercise: discussed healthy eating and exercise recommendations, particularly reduction in dietary carbohydrates    I spent 25 minutes with the patient today and > 50% of the time was spent counseling the patient about diabetes management including medication options and dietary modification. Thank you for the opportunity to participate in this patient's care.     Yimi De La Cruz MD  [place] [facility]  [facility]

## 2019-04-09 LAB
EST. AVERAGE GLUCOSE BLD GHB EST-MCNC: 120 MG/DL
HBA1C MFR BLD: 5.8 % (ref 4.8–5.6)

## 2019-04-29 RX ORDER — ATORVASTATIN CALCIUM 40 MG/1
TABLET, FILM COATED ORAL
Qty: 90 TAB | Refills: 2 | Status: SHIPPED | OUTPATIENT
Start: 2019-04-29 | End: 2021-08-19 | Stop reason: SDUPTHER

## 2019-06-07 ENCOUNTER — TELEPHONE (OUTPATIENT)
Dept: ENDOCRINOLOGY | Age: 59
End: 2019-06-07

## 2019-06-07 RX ORDER — INSULIN GLARGINE 100 [IU]/ML
INJECTION, SOLUTION SUBCUTANEOUS
Qty: 5 PEN | Refills: 3 | Status: CANCELLED | OUTPATIENT
Start: 2019-06-07

## 2019-06-07 NOTE — TELEPHONE ENCOUNTER
6/7/2019  11:48 AM      Ms.  Am need a refill please use mail service    insulin glargine (LANTUS SOLOSTAR U-100 INSULIN) 100 unit/mL (3 mL) inpn    BD Ultra Fine Pen Needles (NUBIA PEN NEEDLE)  4mm x 32 gauge ndle           [facility] - [address] ([facility])

## 2019-06-11 NOTE — TELEPHONE ENCOUNTER
Called and spoke to [facility] the pharmacist at [facility] to see whether or not our rx for the requested refills was received in April and she stated that both rx was received but they were never filled. She then ran the rx to make she rx was covered by the insurance plan and they were. She then stated that she will fill the rx and send it our to the pt. I called to make pt aware but was told that she was at work.

## 2020-05-12 ENCOUNTER — TELEPHONE (OUTPATIENT)
Dept: ENDOCRINOLOGY | Age: 60
End: 2020-05-12

## 2020-05-12 RX ORDER — INSULIN GLARGINE 100 [IU]/ML
INJECTION, SOLUTION SUBCUTANEOUS
Qty: 5 PEN | Refills: 3 | OUTPATIENT
Start: 2020-05-12

## 2020-05-12 NOTE — TELEPHONE ENCOUNTER
Please call her pharmacy and let them know she will need to contact her PCP for a refill as she never established with a doctor in our practice after Dr. Mike Craft left in 5/20.

## 2020-06-09 ENCOUNTER — HOSPITAL ENCOUNTER (OUTPATIENT)
Dept: MAMMOGRAPHY | Age: 60
Discharge: HOME | End: 2020-03-18
Attending: FAMILY MEDICINE

## 2020-06-09 ENCOUNTER — HOSPITAL ENCOUNTER (OUTPATIENT)
Dept: MAMMOGRAPHY | Age: 60
Discharge: HOME | End: 2020-06-09
Attending: FAMILY MEDICINE
Payer: COMMERCIAL

## 2020-06-09 DIAGNOSIS — Z12.31 VISIT FOR SCREENING MAMMOGRAM: ICD-10-CM

## 2020-06-09 PROCEDURE — 77067 SCR MAMMO BI INCL CAD: CPT

## 2020-06-12 ENCOUNTER — TELEPHONE (OUTPATIENT)
Dept: ENDOCRINOLOGY | Age: 60
End: 2020-06-12

## 2020-06-12 RX ORDER — INSULIN GLARGINE 100 [IU]/ML
INJECTION, SOLUTION SUBCUTANEOUS
Qty: 5 PEN | Refills: 3 | OUTPATIENT
Start: 2020-06-12

## 2020-06-12 NOTE — TELEPHONE ENCOUNTER
Please call her and let them know she will need to contact her PCP for a refill as she never established with a doctor in our practice after Dr. Tracy Scott left in 5/20. We had previously notified Mack of this on 5/13/20 but received another refill request today.

## 2020-11-23 ENCOUNTER — TELEPHONE (OUTPATIENT)
Dept: ENDOCRINOLOGY | Age: 60
End: 2020-11-23

## 2020-11-23 RX ORDER — INSULIN GLARGINE 100 [IU]/ML
INJECTION, SOLUTION SUBCUTANEOUS
Qty: 1 PEN | Refills: 0 | Status: CANCELLED | OUTPATIENT
Start: 2020-11-23

## 2020-11-23 NOTE — TELEPHONE ENCOUNTER
Pt stopped by the office to get a renewal on her Lantus insulin. Pt was made aware that it has been over a year since she was last seen, therefore, she will need to schedule an appt before a rx will be ordered. Zoraida,  Can you call to schedule Ms Am a 4:10 appt? Once scheduled, can you forward this message back to me please.

## 2020-11-23 NOTE — TELEPHONE ENCOUNTER
11/23/2020  4:58 PM      Called Ms. Am to schedule an appointment doesn't want to schedule an appointment I need to call back and speak with her Ms. Am is unsure when her daughter gets off.

## 2021-05-14 ENCOUNTER — OFFICE VISIT (OUTPATIENT)
Dept: INTERNAL MEDICINE CLINIC | Age: 61
End: 2021-05-14
Payer: COMMERCIAL

## 2021-05-14 ENCOUNTER — TELEPHONE (OUTPATIENT)
Dept: INTERNAL MEDICINE CLINIC | Age: 61
End: 2021-05-14

## 2021-05-14 VITALS
HEART RATE: 89 BPM | HEIGHT: 63 IN | SYSTOLIC BLOOD PRESSURE: 127 MMHG | WEIGHT: 108 LBS | TEMPERATURE: 97.1 F | BODY MASS INDEX: 19.14 KG/M2 | RESPIRATION RATE: 16 BRPM | DIASTOLIC BLOOD PRESSURE: 74 MMHG | OXYGEN SATURATION: 99 %

## 2021-05-14 DIAGNOSIS — Z79.4 TYPE 2 DIABETES MELLITUS WITH HYPERGLYCEMIA, WITH LONG-TERM CURRENT USE OF INSULIN (HCC): Primary | ICD-10-CM

## 2021-05-14 DIAGNOSIS — Z23 NEED FOR TDAP VACCINATION: ICD-10-CM

## 2021-05-14 DIAGNOSIS — Z76.89 ENCOUNTER TO ESTABLISH CARE: ICD-10-CM

## 2021-05-14 DIAGNOSIS — Z00.00 BLOOD TESTS FOR ROUTINE GENERAL PHYSICAL EXAMINATION: ICD-10-CM

## 2021-05-14 DIAGNOSIS — Z11.59 NEED FOR HEPATITIS C SCREENING TEST: ICD-10-CM

## 2021-05-14 DIAGNOSIS — E11.65 TYPE 2 DIABETES MELLITUS WITH HYPERGLYCEMIA, WITH LONG-TERM CURRENT USE OF INSULIN (HCC): Primary | ICD-10-CM

## 2021-05-14 DIAGNOSIS — E78.2 MIXED HYPERLIPIDEMIA: ICD-10-CM

## 2021-05-14 DIAGNOSIS — F32.A DEPRESSION, UNSPECIFIED DEPRESSION TYPE: ICD-10-CM

## 2021-05-14 PROCEDURE — 90715 TDAP VACCINE 7 YRS/> IM: CPT | Performed by: NURSE PRACTITIONER

## 2021-05-14 PROCEDURE — 99204 OFFICE O/P NEW MOD 45 MIN: CPT | Performed by: NURSE PRACTITIONER

## 2021-05-14 RX ORDER — METFORMIN HYDROCHLORIDE 1000 MG/1
TABLET ORAL
COMMUNITY
Start: 2021-02-13 | End: 2021-05-14 | Stop reason: SDUPTHER

## 2021-05-14 RX ORDER — PEN NEEDLE, DIABETIC 30 GX3/16"
NEEDLE, DISPOSABLE MISCELLANEOUS
Qty: 1 PACKAGE | Refills: 11 | Status: SHIPPED | OUTPATIENT
Start: 2021-05-14

## 2021-05-14 RX ORDER — INSULIN GLARGINE 100 [IU]/ML
10 INJECTION, SOLUTION SUBCUTANEOUS DAILY
COMMUNITY
End: 2021-05-14 | Stop reason: SDUPTHER

## 2021-05-14 RX ORDER — LISINOPRIL 2.5 MG/1
2.5 TABLET ORAL DAILY
Qty: 90 TAB | Refills: 1 | Status: SHIPPED | OUTPATIENT
Start: 2021-05-14 | End: 2021-08-19

## 2021-05-14 RX ORDER — LINAGLIPTIN 5 MG/1
5 TABLET, FILM COATED ORAL DAILY
Qty: 90 TAB | Refills: 1 | Status: SHIPPED | OUTPATIENT
Start: 2021-05-14 | End: 2021-11-05

## 2021-05-14 RX ORDER — INSULIN GLARGINE 100 [IU]/ML
10 INJECTION, SOLUTION SUBCUTANEOUS DAILY
Qty: 5 PEN | Refills: 1 | Status: SHIPPED | OUTPATIENT
Start: 2021-05-14 | End: 2021-08-19

## 2021-05-14 RX ORDER — LISINOPRIL 2.5 MG/1
TABLET ORAL
COMMUNITY
Start: 2021-02-16 | End: 2021-05-14 | Stop reason: SDUPTHER

## 2021-05-14 RX ORDER — ATORVASTATIN CALCIUM 40 MG/1
TABLET, FILM COATED ORAL
COMMUNITY
Start: 2021-03-02 | End: 2021-05-14 | Stop reason: SDUPTHER

## 2021-05-14 RX ORDER — LINAGLIPTIN 5 MG/1
TABLET, FILM COATED ORAL
COMMUNITY
Start: 2021-03-01 | End: 2021-05-14 | Stop reason: SDUPTHER

## 2021-05-14 RX ORDER — ATORVASTATIN CALCIUM 40 MG/1
40 TABLET, FILM COATED ORAL DAILY
Qty: 90 TAB | Refills: 1 | Status: SHIPPED | OUTPATIENT
Start: 2021-05-14 | End: 2021-11-03

## 2021-05-14 RX ORDER — METFORMIN HYDROCHLORIDE 1000 MG/1
TABLET ORAL
Qty: 180 TAB | Refills: 1 | Status: SHIPPED | OUTPATIENT
Start: 2021-05-14 | End: 2021-11-05

## 2021-05-14 NOTE — ASSESSMENT & PLAN NOTE
Longstanding. No acute crisis. No SI/HI. Given language barrier and history-Referred to Psychiatry and Counseling for management-list of providers given and pt/brother will contact to set up appts. Pt and brother agreeable with plan.

## 2021-05-14 NOTE — PROGRESS NOTES
Delia Vinson is a 63y.o. year old female who is a new patient to me today (05/14/21). She was previous followed at [facility].        Assessment & Plan:     Diagnoses and all orders for this visit:    1. Type 2 diabetes mellitus with hyperglycemia, with long-term current use of insulin ([address]  Assessment & Plan:  Uncontrolled per reported history, Labs ordered for further evaluation. DM foot eval WNL. Counseled as to need to be checking cBG 1-2 times daily-target range reviewed. For now will continue current medications-needed refill sent. Plan follow up in 3-4 weeks. Discussed with pt may consider refresher DM education with our pharmacist-will evaluate labs first.    Orders:  -     METABOLIC PANEL, COMPREHENSIVE; Future  -     CBC WITH AUTOMATED DIFF; Future  -     TSH 3RD GENERATION; Future  -     HEMOGLOBIN A1C WITH EAG; Future  -     MICROALBUMIN, UR, RAND W/ MICROALB/CREAT RATIO; Future  -     metFORMIN (GLUCOPHAGE) 1,000 mg tablet; TAKE 1 TABLET BY MOUTH TWICE DAILY WITH MEALS  -     lisinopriL (PRINIVIL, ZESTRIL) 2.5 mg tablet; Take 1 Tab by mouth daily.  -     Tradjenta 5 mg tablet; Take 1 Tab by mouth daily. -     insulin glargine (LANTUS,BASAGLAR) 100 unit/mL (3 mL) inpn; 10 Units by SubCUTAneous route daily.  -     Insulin Needles, Disposable, (BD Ultra-Fine Short Pen Needle) 31 gauge x 5/16\" ndle; Us as directed with Lantus Insulin Pen. Dx E11.65, M25.1  -     METABOLIC PANEL, COMPREHENSIVE; Future  -     CBC WITH AUTOMATED DIFF; Future  -     TSH 3RD GENERATION; Future  -     HEMOGLOBIN A1C WITH EAG; Future  -     MICROALBUMIN, UR, RAND W/ MICROALB/CREAT RATIO; Future  -     HM DIABETES FOOT EXAM    2. Mixed hyperlipidemia  Assessment & Plan:   unclear control, continue current medications pending work up below    Orders:  -     LIPID PANEL; Future  -     atorvastatin (LIPITOR) 40 mg tablet; Take 1 Tab by mouth daily.  -     LIPID PANEL; Future    3. Encounter to establish care    4.  Blood tests for routine general physical examination  -     LIPID PANEL; Future  -     METABOLIC PANEL, COMPREHENSIVE; Future  -     CBC WITH AUTOMATED DIFF; Future  -     TSH 3RD GENERATION; Future  -     LIPID PANEL; Future  -     METABOLIC PANEL, COMPREHENSIVE; Future  -     CBC WITH AUTOMATED DIFF; Future  -     TSH 3RD GENERATION; Future    5. Need for hepatitis C screening test  -     HEPATITIS C AB; Future  -     HEPATITIS C AB; Future    6. Need for Tdap vaccination  -     TETANUS, DIPHTHERIA TOXOIDS AND ACELLULAR PERTUSSIS VACCINE (TDAP), IN INDIVIDS. >=7, IM    7. Depression, unspecified depression type  Assessment & Plan:  Longstanding. No acute crisis. No SI/HI. Given language barrier and history-Referred to Psychiatry and Counseling for management-list of providers given and pt/brother will contact to set up appts. Pt and brother agreeable with plan. Follow-up and Dispositions    · Return in about 1 month (around 6/14/2021), or sooner if symptoms worsen or fail to improve, for Annual Physical and follow up. Subjective:   Shlomo was seen today for Establish Care, Diabetes, and Cholesterol Problem  62 yo  female presents to the office today to establish care. She is accompanied today by her brother who she brought with the intent of using him as an -she has some limited [place]. Offered Language Line-refused. Medical hx reviewed with her and documented. Medication reconciliation completed with them and med list is accurate-needs refills on all meds today-denies SE or problems with meds. Type 2 DM-Dx in [id number] known complications. Admits when she tested (had not done so in a couple of months) she was running 180's-200 fasting. She apparently followed with an Endocrinologist previously, but she cannot remember name and he \"moved\".  Since then she has only been going to [facility]. She currently is on Metformin 1,000 mg BID with meals, Tradjenta 5 mg daily, and Lantus Insulin 10 units at HS daily. Denies s/s hypoglycemia. Does admit polyuria. Denies hx of HTN-reports only on Lisinopril 2.5 mg daily for kidney protection from the DM. Hyperlipidemia-due for labs. Currently on Atorvastatin 40 mg at HS daily-denies leg weakness/pain. Depression-was on medication previously then off for a while without problems-not sure of what med. Reports depressed mood past 2-3 years surrounding the death of her . Denies SI/HI. Admits mood likely barrier with motivation to be checking cBG. Does not have therapist/counselor. The following sections were reviewed & updated as appropriate: PMH, PL, PSH, FH, and SH. Patient Active Problem List   Diagnosis Code    Mixed hyperlipidemia E78.2    Type 2 diabetes mellitus with hyperglycemia, with long-term current use of insulin ([facility]) E11.65, Z79.4    Depression F32.9      Past Medical History:   Diagnosis Date    Depression     Diabetes ([facility] [address]     Hypercholesterolemia    History reviewed. No pertinent surgical history. Family History   Problem Relation Age of Onset    Diabetes Mother     Psychiatric Disorder Mother     Cancer Father         liver    Stroke Father     Kidney Disease Father     Diabetes Sister        Prior to Admission medications    Medication Sig Start Date End Date Taking? Authorizing Provider   metFORMIN (GLUCOPHAGE) 1,000 mg tablet TAKE 1 TABLET BY MOUTH TWICE DAILY WITH MEALS 5/14/21  Yes ALECIA Jha   lisinopriL (PRINIVIL, ZESTRIL) 2.5 mg tablet Take 1 Tab by mouth daily. 5/14/21  Yes ALECIA Jha   Tradjenta 5 mg tablet Take 1 Tab by mouth daily. 5/14/21  Yes ALECIA Jha   atorvastatin (LIPITOR) 40 mg tablet Take 1 Tab by mouth daily. 5/14/21  Yes ALECIA Jha   insulin glargine (LANTUS,BASAGLAR) 100 unit/mL (3 mL) inpn 10 Units by SubCUTAneous route daily.  5/14/21  Yes ALECIA hJa   Insulin Needles, Disposable, (BD Ultra-Fine Short Pen Needle) 31 gauge x 5/16\" ndle Us as directed with Lantus Insulin Pen. Dx E11.65, Z79.4 5/14/21  Yes Galo Duarte, FNP          No Known Allergies         Review of Systems   Constitutional: Negative for chills, diaphoresis, fever, malaise/fatigue and weight loss. HENT: Negative. Eyes: Positive for blurred vision. Respiratory: Negative for cough, hemoptysis, sputum production, shortness of breath and wheezing. Cardiovascular: Negative for chest pain, palpitations, orthopnea, claudication and leg swelling. Gastrointestinal: Positive for diarrhea (occasionally). Negative for abdominal pain, blood in stool, constipation, heartburn, melena, nausea and vomiting. Genitourinary: Positive for frequency. Negative for dysuria, flank pain, hematuria and urgency. Musculoskeletal: Positive for joint pain (chronic). Negative for back pain, falls, myalgias and neck pain. Skin: Negative. Neurological: Positive for weakness. Negative for tingling, sensory change, focal weakness, loss of consciousness and headaches. Psychiatric/Behavioral: Positive for depression. Negative for hallucinations, substance abuse and suicidal ideas. The patient is nervous/anxious and has insomnia. Objective:   Physical Exam  Vitals signs reviewed. Constitutional:       General: She is not in acute distress. Appearance: She is well-developed, well-groomed and normal weight. HENT:      Head: Normocephalic and atraumatic. Right Ear: Tympanic membrane, ear canal and external ear normal.      Left Ear: Tympanic membrane, ear canal and external ear normal.      Nose: Nose normal.      Mouth/Throat:      Mouth: Mucous membranes are moist.      Pharynx: Oropharynx is clear. Eyes:      General: No scleral icterus. Extraocular Movements: Extraocular movements intact. Conjunctiva/sclera: Conjunctivae normal.   Neck:      Musculoskeletal: Normal range of motion and neck supple. Thyroid: No thyromegaly.       Vascular: No carotid bruit or JVD. Cardiovascular:      Rate and Rhythm: Normal rate and regular rhythm. Pulses: Normal pulses. Dorsalis pedis pulses are 2+ on the right side and 2+ on the left side. Posterior tibial pulses are 2+ on the right side and 2+ on the left side. Heart sounds: Normal heart sounds, S1 normal and S2 normal.   Pulmonary:      Effort: Pulmonary effort is normal. No respiratory distress. Breath sounds: Normal breath sounds. Abdominal:      General: Bowel sounds are normal. There is no distension. Palpations: Abdomen is soft. There is no hepatomegaly or splenomegaly. Tenderness: There is no abdominal tenderness. Musculoskeletal: Normal range of motion. Right lower leg: No edema. Left lower leg: No edema. Feet:      Right foot:      Skin integrity: Skin integrity normal.      Left foot:      Skin integrity: Skin integrity normal.      Comments: Normal sensation monofilament testing bilateral feet/toes. Lymphadenopathy:      Cervical: No cervical adenopathy. Skin:     General: Skin is warm and dry. Comments: No noted lipohypertrophy at insulin injection sites on abd. Neurological:      Mental Status: She is alert and oriented to person, place, and time. Sensory: No sensory deficit. Motor: No weakness. Coordination: Coordination normal.      Gait: Gait normal.   Psychiatric:         Attention and Perception: Attention normal.         Mood and Affect: Affect normal. Mood is depressed. Behavior: Behavior normal. Behavior is cooperative.             Visit Vitals  /74 (BP 1 Location: Right arm, BP Patient Position: Sitting, BP Cuff Size: Adult)   Pulse 89   Temp 97.1 °F (36.2 °C) (Temporal)   Resp 16   Ht 5' 2.5\" (1.588 m)   Wt 108 lb (49 kg)   SpO2 99%   BMI 19.44 kg/m²         Disclaimer:  Advised her to call back or return to office if symptoms worsen/change/persist.  Discussed expected course/resolution/complications of diagnosis in detail with patient. Medication risks/benefits/costs/interactions/alternatives discussed with patient. She was given an after visit summary which includes diagnoses, current medications, & vitals. She expressed understanding and agrees with the diagnosis and plan.       ALECIA Saleh

## 2021-05-14 NOTE — PATIENT INSTRUCTIONS
Type 2 Diabetes: Care Instructions  Your Care Instructions     Type 2 diabetes is a disease that develops when the body's tissues cannot use insulin properly. Over time, the pancreas cannot make enough insulin. Insulin is a hormone that helps the body's cells use sugar (glucose) for energy. It also helps the body store extra sugar in muscle, fat, and liver cells. Without insulin, the sugar cannot get into the cells to do its work. It stays in the blood instead. This can cause high blood sugar levels. A person has diabetes when the blood sugar stays too high too much of the time. Over time, diabetes can lead to diseases of the heart, blood vessels, nerves, kidneys, and eyes. You may be able to control your blood sugar by losing weight, eating a healthy diet, and getting daily exercise. You may also have to take insulin or other diabetes medicine. Follow-up care is a key part of your treatment and safety. Be sure to make and go to all appointments. Call your doctor if you are having problems. It's also a good idea to know your test results and keep a list of the medicines you take. How can you care for yourself at home? · Keep your blood sugar at a target level (which you set with your doctor). ? Carbohydrate--the body's main source of fuel--affects blood sugar more than any other nutrient. Carbohydrate is in fruits, vegetables, milk, and yogurt. It also is in breads, cereals, vegetables such as potatoes and corn, and sugary foods such as candy and cakes. Follow your meal plan to know how much carbohydrate to eat at each meal and snack. ? Aim for 30 minutes of exercise on most, preferably all, days of the week. Walking is a good choice. You also may want to do other activities, such as running, swimming, cycling, or playing tennis or team sports. Try to do muscle-strengthening exercises at least 2 times a week. ? Take your medicines exactly as prescribed.  Call your doctor if you think you are having a problem with your medicine. You will get more details on the specific medicines your doctor prescribes. · Check your blood sugar as often as your doctor recommends. It is important to keep track of any symptoms you have, such as low blood sugar. Also tell your doctor if you have any changes in your activities, diet, or insulin use. · Talk to your doctor before you start taking aspirin every day. Aspirin can help certain people lower their risk of a heart attack or stroke. But taking aspirin isn't right for everyone, because it can cause serious bleeding. · Do not smoke. If you need help quitting, talk to your doctor about stop-smoking programs and medicines. These can increase your chances of quitting for good. · Keep your cholesterol and blood pressure at normal levels. You may need to take one or more medicines to reach your goals. Take them exactly as directed. Do not stop or change a medicine without talking to your doctor first.  When should you call for help? Call 911 anytime you think you may need emergency care. For example, call if:    · You passed out (lost consciousness), or you suddenly become very sleepy or confused. (You may have very low blood sugar.)   Call your doctor now or seek immediate medical care if:    · Your blood sugar is 300 mg/dL or is higher than the level your doctor has set for you.     · You have symptoms of low blood sugar, such as:  ? Sweating. ? Feeling nervous, shaky, and weak. ? Extreme hunger and slight nausea. ? Dizziness and headache.  ? Blurred vision. ? Confusion. Watch closely for changes in your health, and be sure to contact your doctor if:    · You often have problems controlling your blood sugar.     · You have symptoms of long-term diabetes problems, such as:  ? New vision changes. ? New pain, numbness, or tingling in your hands or feet. ? Skin problems. Where can you learn more?   Go to http://www.gray.com/  Enter C553 in the search box to learn more about \"Type 2 Diabetes: Care Instructions. \"  Current as of: August 31, 2020               Content Version: 12.8  © 2006-2021 [employer], [employer]. Care instructions adapted under license by [employer] (which disclaims liability or warranty for this information). If you have questions about a medical condition or this instruction, always ask your healthcare professional. [place] [address] any warranty or liability for your use of this information.

## 2021-05-14 NOTE — PROGRESS NOTES
Verified name and birth date for privacy precautions. Chart reviewed in preparation for today's visit. No chief complaint on file. Health Maintenance Due   Topic    Hepatitis C Screening     Lipid Screen     COVID-19 Vaccine (1)    DTaP/Tdap/Td series (1 - Tdap)    PAP AKA CERVICAL CYTOLOGY     Shingrix Vaccine Age 49> (1 of 2)    Colorectal Cancer Screening Combo     Breast Cancer Screen Mammogram          Wt Readings from Last 3 Encounters:   05/14/21 108 lb (49 kg)     Temp Readings from Last 3 Encounters:   05/14/21 97.1 °F (36.2 °C) (Temporal)     BP Readings from Last 3 Encounters:   05/14/21 127/74     Pulse Readings from Last 3 Encounters:   05/14/21 89         Learning Assessment:  :     No flowsheet data found. Depression Screening:  :     3 most recent PHQ Screens 5/14/2021   Little interest or pleasure in doing things Not at all   Feeling down, depressed, irritable, or hopeless Nearly every day   Total Score PHQ 2 3       Fall Risk Assessment:  :     No flowsheet data found. Abuse Screening:  :     Abuse Screening Questionnaire 5/14/2021   Do you ever feel afraid of your partner? N   Are you in a relationship with someone who physically or mentally threatens you? N   Is it safe for you to go home?  Keith Moya

## 2021-05-16 LAB
ALBUMIN SERPL-MCNC: 4.6 G/DL (ref 3.8–4.9)
ALBUMIN/CREAT UR: 57 MG/G CREAT (ref 0–29)
ALBUMIN/GLOB SERPL: 1.6 {RATIO} (ref 1.2–2.2)
ALP SERPL-CCNC: 71 IU/L (ref 39–117)
ALT SERPL-CCNC: 24 IU/L (ref 0–32)
AST SERPL-CCNC: 50 IU/L (ref 0–40)
BASOPHILS # BLD AUTO: 0.1 X10E3/UL (ref 0–0.2)
BASOPHILS NFR BLD AUTO: 1 %
BILIRUB SERPL-MCNC: 0.5 MG/DL (ref 0–1.2)
BUN SERPL-MCNC: 16 MG/DL (ref 6–24)
BUN/CREAT SERPL: 25 (ref 9–23)
CALCIUM SERPL-MCNC: 9.4 MG/DL (ref 8.7–10.2)
CHLORIDE SERPL-SCNC: 105 MMOL/L (ref 96–106)
CHOLEST SERPL-MCNC: 125 MG/DL (ref 100–199)
CO2 SERPL-SCNC: 24 MMOL/L (ref 20–29)
CREAT SERPL-MCNC: 0.65 MG/DL (ref 0.57–1)
CREAT UR-MCNC: 297.7 MG/DL
EOSINOPHIL # BLD AUTO: 0.2 X10E3/UL (ref 0–0.4)
EOSINOPHIL NFR BLD AUTO: 2 %
ERYTHROCYTE [DISTWIDTH] IN BLOOD BY AUTOMATED COUNT: 12.4 % (ref 11.7–15.4)
EST. AVERAGE GLUCOSE BLD GHB EST-MCNC: 163 MG/DL
GLOBULIN SER CALC-MCNC: 2.9 G/DL (ref 1.5–4.5)
GLUCOSE SERPL-MCNC: 108 MG/DL (ref 65–99)
HBA1C MFR BLD: 7.3 % (ref 4.8–5.6)
HCT VFR BLD AUTO: 40.3 % (ref 34–46.6)
HCV AB S/CO SERPL IA: 0.1 S/CO RATIO (ref 0–0.9)
HDLC SERPL-MCNC: 63 MG/DL
HGB BLD-MCNC: 13.6 G/DL (ref 11.1–15.9)
IMM GRANULOCYTES # BLD AUTO: 0 X10E3/UL (ref 0–0.1)
IMM GRANULOCYTES NFR BLD AUTO: 0 %
LDLC SERPL CALC-MCNC: 50 MG/DL (ref 0–99)
LYMPHOCYTES # BLD AUTO: 1.7 X10E3/UL (ref 0.7–3.1)
LYMPHOCYTES NFR BLD AUTO: 21 %
MCH RBC QN AUTO: 28.4 PG (ref 26.6–33)
MCHC RBC AUTO-ENTMCNC: 33.7 G/DL (ref 31.5–35.7)
MCV RBC AUTO: 84 FL (ref 79–97)
MICROALBUMIN UR-MCNC: 169 UG/ML
MONOCYTES # BLD AUTO: 0.4 X10E3/UL (ref 0.1–0.9)
MONOCYTES NFR BLD AUTO: 5 %
NEUTROPHILS # BLD AUTO: 5.8 X10E3/UL (ref 1.4–7)
NEUTROPHILS NFR BLD AUTO: 71 %
PLATELET # BLD AUTO: 346 X10E3/UL (ref 150–450)
POTASSIUM SERPL-SCNC: 4.7 MMOL/L (ref 3.5–5.2)
PROT SERPL-MCNC: 7.5 G/DL (ref 6–8.5)
RBC # BLD AUTO: 4.79 X10E6/UL (ref 3.77–5.28)
SODIUM SERPL-SCNC: 140 MMOL/L (ref 134–144)
TRIGL SERPL-MCNC: 50 MG/DL (ref 0–149)
TSH SERPL DL<=0.005 MIU/L-ACNC: 0.64 UIU/ML (ref 0.45–4.5)
VLDLC SERPL CALC-MCNC: 12 MG/DL (ref 5–40)
WBC # BLD AUTO: 8.2 X10E3/UL (ref 3.4–10.8)

## 2021-05-17 NOTE — ASSESSMENT & PLAN NOTE
Uncontrolled per reported history, Labs ordered for further evaluation. DM foot eval WNL. Counseled as to need to be checking cBG 1-2 times daily-target range reviewed. For now will continue current medications-needed refill sent. Plan follow up in 3-4 weeks.  Discussed with pt may consider refresher DM education with our pharmacist-will evaluate labs first.

## 2021-05-24 NOTE — PROGRESS NOTES
Hepatitis C screening negative. Lipid panel is normal. CBC normal. TSH normal. Fasting sugar is good at 108, AST up just slightly but remainder of LFT's normal and overall liver function normal-remainder of CMP normal. A1C 7.3% and microalbumin UR is elevated. Please tell pt that her overall blood sugar control is not great and it is stressing her kidneys-she needs to be testing her blood sugars as she and I discussed and bring a hand written log to her next appt with her so that I can review. I will also send her a letter communication.

## 2021-05-25 NOTE — PROGRESS NOTES
Notified Pt's brother and he took notes to translate for her. These labs were done before she started medication. He read me her BS readings and range from , only once was 140.

## 2021-08-19 ENCOUNTER — TELEPHONE (OUTPATIENT)
Dept: INTERNAL MEDICINE CLINIC | Age: 61
End: 2021-08-19

## 2021-08-19 ENCOUNTER — OFFICE VISIT (OUTPATIENT)
Dept: INTERNAL MEDICINE CLINIC | Age: 61
End: 2021-08-19
Payer: COMMERCIAL

## 2021-08-19 ENCOUNTER — OFFICE VISIT (OUTPATIENT)
Dept: INTERNAL MEDICINE CLINIC | Age: 61
End: 2021-08-19

## 2021-08-19 VITALS
BODY MASS INDEX: 19.28 KG/M2 | HEIGHT: 63 IN | HEART RATE: 76 BPM | OXYGEN SATURATION: 99 % | RESPIRATION RATE: 16 BRPM | WEIGHT: 108.8 LBS | DIASTOLIC BLOOD PRESSURE: 68 MMHG | SYSTOLIC BLOOD PRESSURE: 122 MMHG | TEMPERATURE: 97.5 F

## 2021-08-19 DIAGNOSIS — E11.21 TYPE 2 DIABETES WITH NEPHROPATHY (HCC): Primary | ICD-10-CM

## 2021-08-19 DIAGNOSIS — Z79.4 LONG TERM (CURRENT) USE OF INSULIN (HCC): ICD-10-CM

## 2021-08-19 DIAGNOSIS — Z79.4 TYPE 2 DIABETES MELLITUS WITH HYPERGLYCEMIA, WITH LONG-TERM CURRENT USE OF INSULIN (HCC): ICD-10-CM

## 2021-08-19 DIAGNOSIS — E11.9 CONTROLLED TYPE 2 DIABETES MELLITUS WITHOUT COMPLICATION, WITH LONG-TERM CURRENT USE OF INSULIN (HCC): Primary | ICD-10-CM

## 2021-08-19 DIAGNOSIS — I10 ESSENTIAL HYPERTENSION: ICD-10-CM

## 2021-08-19 DIAGNOSIS — Z79.4 CONTROLLED TYPE 2 DIABETES MELLITUS WITHOUT COMPLICATION, WITH LONG-TERM CURRENT USE OF INSULIN (HCC): Primary | ICD-10-CM

## 2021-08-19 DIAGNOSIS — E78.2 MIXED HYPERLIPIDEMIA: ICD-10-CM

## 2021-08-19 DIAGNOSIS — E11.65 TYPE 2 DIABETES MELLITUS WITH HYPERGLYCEMIA, WITH LONG-TERM CURRENT USE OF INSULIN (HCC): ICD-10-CM

## 2021-08-19 PROCEDURE — 99214 OFFICE O/P EST MOD 30 MIN: CPT | Performed by: NURSE PRACTITIONER

## 2021-08-19 RX ORDER — LISINOPRIL 2.5 MG/1
TABLET ORAL
Qty: 90 TABLET | Refills: 1 | Status: SHIPPED | OUTPATIENT
Start: 2021-08-19 | End: 2021-11-19 | Stop reason: SDUPTHER

## 2021-08-19 RX ORDER — INSULIN GLARGINE 100 [IU]/ML
INJECTION, SOLUTION SUBCUTANEOUS
Qty: 9 ML | Refills: 1 | Status: SHIPPED | OUTPATIENT
Start: 2021-08-19 | End: 2021-11-19 | Stop reason: SDUPTHER

## 2021-08-19 RX ORDER — INSULIN GLARGINE 100 [IU]/ML
INJECTION, SOLUTION SUBCUTANEOUS
Qty: 15 ML | Refills: 1 | Status: SHIPPED | OUTPATIENT
Start: 2021-08-19 | End: 2021-08-19

## 2021-08-19 NOTE — PROGRESS NOTES
Pt. Is here for follow up Dm, Htn, Cholesterol. Pt. Is out of her medications. Pt. Recently lost her father to cancer and had to stay home for a month praying.

## 2021-08-19 NOTE — PROGRESS NOTES
Pharmacy Progress Note     Pt was referred to this writer for assistance with medication cost.  Pt states that she has been unable to receive her Lantus. She filled it one time and it went thru. Next time she attempted to fill Lantus pen, it was denied. Called pharmacy, insurance rejected insulin d/t qty - day supply exceeding plan limit. Called insurance. They stated that the pt had exceeded the number of times she could fill at a retail pharmacy and had to use a [facility] [facility]. Called mail order pharmacy. They [place] [address] or mail order if the maintenance med was put thru as a 90 day supply. Provided Rx to [address] (pt prefers) for 90 day supply = 9 mL (3 pens). Provided Lantus savings card to pt to use to decrease copay. Medications Discontinued During This Encounter   Medication Reason    insulin glargine (LANTUS SOLOSTAR U-100 INSULIN) 100 unit/mL (3 mL) inpn Cost of Medication    insulin glargine (LANTUS,BASAGLAR) 100 unit/mL (3 mL) inpn Cost of Medication    insulin glargine (LANTUS,BASAGLAR) 100 unit/mL (3 mL) inpn      Orders Placed This Encounter    DISCONTD: insulin glargine (LANTUS,BASAGLAR) 100 unit/mL (3 mL) inpn     Sig: Inject 10 units SQ QHS     Dispense:  15 mL     Refill:  1    insulin glargine (LANTUS,BASAGLAR) 100 unit/mL (3 mL) inpn     Sig: Inject 10 units SQ QHS     Dispense:  9 mL     Refill:  1         Weston ColemanD, [facility]  Clinical Pharmacist Specialist      For Pharmacy Admin Tracking Only     CPA in place:  Yes   Recommendation Provided To: Patient/Caregiver: 2 via In person   Intervention Detail: Patient Access Assistance/Sample Provided and Refill(s) Provided   Gap Closed?: No   Intervention Accepted By: Patient/Caregiver: 2   Time Spent (min): 60

## 2021-08-19 NOTE — PROGRESS NOTES
Yanelis Bautista is a 63 y.o. female who was seen in clinic today (8/19/2021). Assessment & Plan:     1. Type 2 diabetes with nephropathy ([facility] [address]  Assessment & Plan:   at goal, continue current medications pending work up below, medication adherence emphasized, lifestyle modifications recommended, referred to Shellie Reese  Orders:  -     HEMOGLOBIN A1C WITH EAG; Future  -     METABOLIC PANEL, COMPREHENSIVE; Future  -     MICROALBUMIN, UR, RAND W/ MICROALB/CREAT RATIO; Future  2. Mixed hyperlipidemia  Assessment & Plan:   well controlled, continue current medications  3. Essential hypertension  Assessment & Plan:   well controlled, continue current medications  4. Long term (current) use of insulin ([facility] [address]    Follow-up and Dispositions    · Return in about 3 months (around 11/19/2021), or as needed, for Follow up. Subjective:   Shlomo was seen today for Diabetes  3 month follow up. Reports overall feeling well. No acute complaints today. Nurse thought initially pt was not taking her medications, but in review of medications today by me with pt she is taking all of her currently prescribed meds. She is voicing some possible insurance coverage issues with her insulin, but has been able to take the Lantus 10 units HS daily by use of vial and syringe that she obtained through [facility]. Discussed with her will refer to Lenore our PharMD to review any possible barriers with medications and DM education. Pt agreeable. Will see if she can see her today. Diabetes-She currently is on Metformin 1,000 mg BID with meals, Tradjenta 5 mg daily, and Lantus Insulin 10 units at HS daily. Denies SE or problems with meds. cBG's running  with average cBG 136 over past 14 days per home glucometer-per these readings appears to be at goal now that she is back on her medications-due for updated labs. Pt denies any s/s hypo/hyperglycemia.  Denies hx of HTN-reports only on Lisinopril 2.5 mg daily for kidney protection from the DM. Hyperlipidemia-Currently on Atorvastatin 40 mg at HS daily-denies leg weakness/pain. Hx of depression-voices mood currently stable. Had long period of grief, but doing ok now. Denies SI/HI. Motivated to manage chronic health states.     Brief Labs:     Lab Results   Component Value Date/Time    Sodium 140 05/14/2021 11:15 AM    Potassium 4.7 05/14/2021 11:15 AM    Creatinine 0.65 05/14/2021 11:15 AM    Cholesterol, total 125 05/14/2021 11:15 AM    HDL Cholesterol 63 05/14/2021 11:15 AM    LDL, calculated 50 05/14/2021 11:15 AM    Triglyceride 50 05/14/2021 11:15 AM    Hemoglobin A1c 7.3 05/14/2021 11:15 AM    TSH 0.642 05/14/2021 11:15 AM          Prior to Admission medications    Medication Sig Start Date End Date Taking? Authorizing Provider   metFORMIN (GLUCOPHAGE) 1,000 mg tablet TAKE 1 TABLET BY MOUTH TWICE DAILY WITH MEALS 5/14/21  Yes ALECIA Pierre   Tradjenta 5 mg tablet Take 1 Tab by mouth daily. 5/14/21  Yes ALECIA Pierre   atorvastatin (LIPITOR) 40 mg tablet Take 1 Tab by mouth daily. 5/14/21  Yes ALECIA Pierre   PROAIR HFA 90 mcg/actuation inhaler  1/19/18  Yes Provider, Historical   glucose blood VI test strips (ONETOUCH VERIO) strip Check BG 2-3 times/day 1/26/18  Yes Kendrick Myers MD   insulin glargine (LANTUS,BASAGLAR) 100 unit/mL (3 mL) inpn Inject 10 units SQ QHS 8/19/21   ALECIA Pierre   lisinopriL (PRINIVIL, ZESTRIL) 2.5 mg tablet TAKE 1 TABLET BY MOUTH EVERY DAY 8/19/21   ALECIA Pierre   Insulin Needles, Disposable, (BD Ultra-Fine Short Pen Needle) 31 gauge x 5/16\" ndle Us as directed with Lantus Insulin Pen. Dx E11.65, Z79.4 5/14/21   ALECIA Pierre   Insulin Needles, Disposable, (NUBIA PEN NEEDLE) 32 gauge x 5/32\" ndle 1 Each by Does Not Apply route daily. 4/8/19   Kendrick Myers MD   Insulin Needles, Disposable, 31 gauge x 5/16\" ndle by SubCUTAneous route daily.  10/19/17   Kendrick Myers MD          No Known Allergies Review of Systems   Constitutional: Negative for chills, diaphoresis, fever, malaise/fatigue and weight loss. Eyes: Negative. Cardiovascular: Negative for chest pain, palpitations, orthopnea, claudication and leg swelling. Gastrointestinal: Negative. Genitourinary: Negative. Musculoskeletal: Negative. Endo/Heme/Allergies: Negative for polydipsia. Psychiatric/Behavioral:        Handling grief appropriately per culture. Objective:   Physical Exam  Vitals reviewed. Constitutional:       General: She is not in acute distress. Appearance: She is well-developed, well-groomed and normal weight. HENT:      Head: Normocephalic and atraumatic. Right Ear: Tympanic membrane, ear canal and external ear normal.      Left Ear: Tympanic membrane, ear canal and external ear normal.      Nose: Nose normal.      Mouth/Throat:      Mouth: Mucous membranes are moist.      Pharynx: Oropharynx is clear. Eyes:      General: No scleral icterus. Extraocular Movements: Extraocular movements intact. Conjunctiva/sclera: Conjunctivae normal.   Neck:      Thyroid: No thyromegaly. Vascular: No carotid bruit or JVD. Cardiovascular:      Rate and Rhythm: Normal rate and regular rhythm. Pulses: Normal pulses. Dorsalis pedis pulses are 2+ on the right side and 2+ on the left side. Posterior tibial pulses are 2+ on the right side and 2+ on the left side. Heart sounds: Normal heart sounds, S1 normal and S2 normal.   Pulmonary:      Effort: Pulmonary effort is normal. No respiratory distress. Breath sounds: Normal breath sounds. Abdominal:      General: Bowel sounds are normal. There is no distension. Palpations: Abdomen is soft. There is no hepatomegaly or splenomegaly. Tenderness: There is no abdominal tenderness. Musculoskeletal:         General: Normal range of motion. Cervical back: Normal range of motion and neck supple.       Right lower leg: No edema. Left lower leg: No edema. Feet:      Right foot:      Skin integrity: Skin integrity normal.      Left foot:      Skin integrity: Skin integrity normal.      Comments: Normal sensation monofilament testing bilateral feet/toes. Lymphadenopathy:      Cervical: No cervical adenopathy. Skin:     General: Skin is warm and dry. Comments: No noted lipohypertrophy at insulin injection sites on abd. Neurological:      Mental Status: She is alert and oriented to person, place, and time. Sensory: No sensory deficit. Motor: No weakness. Coordination: Coordination normal.      Gait: Gait normal.   Psychiatric:         Attention and Perception: Attention normal.         Mood and Affect: Mood and affect normal.         Behavior: Behavior normal. Behavior is cooperative. Visit Vitals  /68 (BP 1 Location: Right arm, BP Patient Position: Sitting, BP Cuff Size: Adult)   Pulse 76   Temp 97.5 °F (36.4 °C) (Temporal)   Resp 16   Ht 5' 2.5\" (1.588 m)   Wt 108 lb 12.8 oz (49.4 kg)   SpO2 99%   BMI 19.58 kg/m²         Disclaimer:  Advised her to call back or return to office if symptoms worsen/change/persist.  Discussed expected course/resolution/complications of diagnosis in detail with patient. Medication risks/benefits/costs/interactions/alternatives discussed with patient. She was given an after visit summary which includes diagnoses, current medications, & vitals. She expressed understanding and agrees with the diagnosis and plan.         ALECIA Dodd

## 2021-08-19 NOTE — PATIENT INSTRUCTIONS

## 2021-08-19 NOTE — TELEPHONE ENCOUNTER
Pharmacy Progress Note - Telephone Encounter    S/O: Ms. Rebekah Hui 63 y.o. female, referred by Dr. Wei Kim, [facility], was contacted via an outbound telephone call to discuss Lantus coverage today. Verified patients identifiers (name & ) per HIPAA policy. - Pt called this writer stating there was an issue with her Lantus Rx at [facility]. Called [facility], they state Lantus is \"refill too soon\" until 10/26/21. Called [facility] (mail order pharmacy), the Lantus Rx was still active in their system. This order was inactivated during the call. Called [facility] back, Lantus was reprocessed and copay will be $10 for 90 day supply. Informed pharmacist at [facility] pt may have copay card to decrease price further. Pt was still at the pharmacy and informed of medication cost.    There are no discontinued medications. No orders of the defined types were placed in this encounter.       Miguel A Kincaid, PharmD, BCGP  Clinical Pharmacist Specialist      For Pharmacy Admin Tracking Only     Time Spent (min): 30

## 2021-08-20 LAB
ALBUMIN SERPL-MCNC: 4.1 G/DL (ref 3.8–4.9)
ALBUMIN/CREAT UR: 178 MG/G CREAT (ref 0–29)
ALBUMIN/GLOB SERPL: 1.6 {RATIO} (ref 1.2–2.2)
ALP SERPL-CCNC: 66 IU/L (ref 48–121)
ALT SERPL-CCNC: 10 IU/L (ref 0–32)
AST SERPL-CCNC: 25 IU/L (ref 0–40)
BILIRUB SERPL-MCNC: 0.4 MG/DL (ref 0–1.2)
BUN SERPL-MCNC: 14 MG/DL (ref 6–24)
BUN/CREAT SERPL: 25 (ref 9–23)
CALCIUM SERPL-MCNC: 9 MG/DL (ref 8.7–10.2)
CHLORIDE SERPL-SCNC: 105 MMOL/L (ref 96–106)
CO2 SERPL-SCNC: 26 MMOL/L (ref 20–29)
CREAT SERPL-MCNC: 0.55 MG/DL (ref 0.57–1)
CREAT UR-MCNC: 29.6 MG/DL
EST. AVERAGE GLUCOSE BLD GHB EST-MCNC: 126 MG/DL
GLOBULIN SER CALC-MCNC: 2.6 G/DL (ref 1.5–4.5)
GLUCOSE SERPL-MCNC: 62 MG/DL (ref 65–99)
HBA1C MFR BLD: 6 % (ref 4.8–5.6)
MICROALBUMIN UR-MCNC: 52.6 UG/ML
POTASSIUM SERPL-SCNC: 4.4 MMOL/L (ref 3.5–5.2)
PROT SERPL-MCNC: 6.7 G/DL (ref 6–8.5)
SODIUM SERPL-SCNC: 141 MMOL/L (ref 134–144)

## 2021-08-23 NOTE — PROGRESS NOTES
Glucose slightly low at 62, remainder of CMP stable. Microalbumin UR elevated at 178 (she is on renal protection dosing of Lisinopril). A1C 6.0%. Some concern about her diabetes being too tightly controlled and risk of hypoglycemia. Will ask nursing to please call pt and instruct her to decrease her Lantus insulin dose down to 8 units HS.

## 2021-08-24 PROBLEM — Z79.4 LONG TERM (CURRENT) USE OF INSULIN (HCC): Status: ACTIVE | Noted: 2021-08-24

## 2021-08-24 NOTE — ASSESSMENT & PLAN NOTE
at goal, continue current medications pending work up below, medication adherence emphasized, lifestyle modifications recommended, referred to Jayla Robins, [address]

## 2021-08-25 NOTE — PROGRESS NOTES
Left another message to return the call and speak with a nurse. Pt. Is at work and cannot be called.  Letter sent with results and to confirm directions to decrease insulin to 8u qpm.

## 2021-11-02 DIAGNOSIS — E78.2 MIXED HYPERLIPIDEMIA: ICD-10-CM

## 2021-11-03 RX ORDER — ATORVASTATIN CALCIUM 40 MG/1
TABLET, FILM COATED ORAL
Qty: 90 TABLET | Refills: 1 | Status: SHIPPED | OUTPATIENT
Start: 2021-11-03

## 2021-11-05 DIAGNOSIS — E11.65 TYPE 2 DIABETES MELLITUS WITH HYPERGLYCEMIA, WITH LONG-TERM CURRENT USE OF INSULIN (HCC): ICD-10-CM

## 2021-11-05 DIAGNOSIS — Z79.4 TYPE 2 DIABETES MELLITUS WITH HYPERGLYCEMIA, WITH LONG-TERM CURRENT USE OF INSULIN (HCC): ICD-10-CM

## 2021-11-05 RX ORDER — LINAGLIPTIN 5 MG/1
TABLET, FILM COATED ORAL
Qty: 90 TABLET | Refills: 1 | Status: SHIPPED | OUTPATIENT
Start: 2021-11-05

## 2021-11-05 RX ORDER — METFORMIN HYDROCHLORIDE 1000 MG/1
TABLET ORAL
Qty: 180 TABLET | Refills: 1 | Status: SHIPPED | OUTPATIENT
Start: 2021-11-05

## 2021-11-19 ENCOUNTER — OFFICE VISIT (OUTPATIENT)
Dept: INTERNAL MEDICINE CLINIC | Age: 61
End: 2021-11-19
Payer: COMMERCIAL

## 2021-11-19 VITALS
DIASTOLIC BLOOD PRESSURE: 58 MMHG | RESPIRATION RATE: 16 BRPM | WEIGHT: 111 LBS | HEIGHT: 62 IN | OXYGEN SATURATION: 99 % | HEART RATE: 76 BPM | BODY MASS INDEX: 20.43 KG/M2 | SYSTOLIC BLOOD PRESSURE: 100 MMHG | TEMPERATURE: 97.5 F

## 2021-11-19 DIAGNOSIS — M25.542 ARTHRALGIA OF BOTH HANDS: Primary | ICD-10-CM

## 2021-11-19 DIAGNOSIS — Z79.4 CONTROLLED TYPE 2 DIABETES MELLITUS WITHOUT COMPLICATION, WITH LONG-TERM CURRENT USE OF INSULIN (HCC): ICD-10-CM

## 2021-11-19 DIAGNOSIS — M25.541 ARTHRALGIA OF BOTH HANDS: Primary | ICD-10-CM

## 2021-11-19 DIAGNOSIS — E11.9 CONTROLLED TYPE 2 DIABETES MELLITUS WITHOUT COMPLICATION, WITH LONG-TERM CURRENT USE OF INSULIN (HCC): ICD-10-CM

## 2021-11-19 DIAGNOSIS — M25.9: ICD-10-CM

## 2021-11-19 PROCEDURE — 99214 OFFICE O/P EST MOD 30 MIN: CPT | Performed by: NURSE PRACTITIONER

## 2021-11-19 RX ORDER — MELOXICAM 7.5 MG/1
7.5 TABLET ORAL DAILY
Qty: 90 TABLET | Refills: 0 | Status: SHIPPED | OUTPATIENT
Start: 2021-11-19

## 2021-11-19 RX ORDER — LISINOPRIL 2.5 MG/1
2.5 TABLET ORAL DAILY
Qty: 90 TABLET | Refills: 1 | Status: SHIPPED | OUTPATIENT
Start: 2021-11-19

## 2021-11-19 RX ORDER — INSULIN GLARGINE 100 [IU]/ML
INJECTION, SOLUTION SUBCUTANEOUS
Qty: 5 PEN | Refills: 1 | Status: SHIPPED | OUTPATIENT
Start: 2021-11-19

## 2021-11-19 RX ORDER — PEN NEEDLE, DIABETIC 31 GX3/16"
1 NEEDLE, DISPOSABLE MISCELLANEOUS DAILY
Qty: 100 PEN NEEDLE | Refills: 3 | Status: SHIPPED | OUTPATIENT
Start: 2021-11-19

## 2021-11-19 NOTE — PROGRESS NOTES
Colleen Valdivia is a 63 y.o. female who was seen in clinic today (11/19/2021). Assessment & Plan:   1. Arthralgia of both hands  Comments:  Start Mobic as directed with food-educated-script sent. Labs ordered to assess for autoimmune. Orders:  -     KAROLINE BY MULTIPLEX FLOW IA, QL; Future  -     RHEUMATOID FACTOR, QL; Future  -     meloxicam (MOBIC) 7.5 mg tablet; Take 1 Tablet by mouth daily. , Normal, Disp-90 Tablet, R-0  2. Controlled type 2 diabetes mellitus without complication, with long-term current use of insulin ([facility])  Assessment & Plan:   Unclear control. Labs ordered for further evaluation. Advised to decrease Lantus insulin to 8 units at HS for now. Continue current doses of Metformin and Tradjenta. Continue low dose Lisinopril for renal protection. Orders:  -     insulin glargine (LANTUS,BASAGLAR) 100 unit/mL (3 mL) inpn; Inject 8 units SQ QHS, Normal, Disp-5 Pen, R-1  -     Insulin Needles, Disposable, (Lani Pen Needle) 32 gauge x 5/32\" ndle; 1 Each by Does Not Apply route daily. , Normal, Disp-100 Pen Needle, R-3  -     lisinopriL (PRINIVIL, ZESTRIL) 2.5 mg tablet; Take 1 Tablet by mouth daily. , Normal, Disp-90 Tablet, R-1  -     HEMOGLOBIN A1C WITH EAG; Future  -     METABOLIC PANEL, COMPREHENSIVE; Future  3. Disorder of joints of finger and hand  -     KAROLINE BY MULTIPLEX FLOW IA, QL; Future  -     RHEUMATOID FACTOR, QL; Future    Total time 38 minutes including counseling and education. Follow-up and Dispositions    · Return if symptoms worsen or fail to improve. Pt to establish with new PCP-has list of available providers. Subjective:   Shlomo was seen today for Diabetes  3 month follow up. Diabetes-She currently is on Metformin 1,000 mg BID with meals, Tradjenta 5 mg daily, and Lantus Insulin 10 units at HS daily.  Following last visit she was instructed to decrease the Lantus to 8 units at HS due to noted lower fasting glucose, but she voiced never did because she was afraid of running high. She denies s/s hypo/hyperglycemia. Counseled about risks of hypoglycemia. Denies SE or problems with meds. cBG 102 this morning and yesterday 139. She voices overall she feels well. Her only complaint is that of hand/finger pain bilaterally with noted joint deformities of hands. 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 Brief Labs:     Lab Results   Component Value Date/Time    Sodium 141 08/19/2021 09:01 AM    Potassium 4.4 08/19/2021 09:01 AM    Creatinine 0.55 08/19/2021 09:01 AM    Cholesterol, total 125 05/14/2021 11:15 AM    HDL Cholesterol 63 05/14/2021 11:15 AM    LDL, calculated 50 05/14/2021 11:15 AM    Triglyceride 50 05/14/2021 11:15 AM    Hemoglobin A1c 6.0 08/19/2021 09:01 AM    TSH 0.642 05/14/2021 11:15 AM          Prior to Admission medications    Medication Sig Start Date End Date Taking? Authorizing Provider   metFORMIN (GLUCOPHAGE) 1,000 mg tablet TAKE 1 TABLET BY MOUTH TWICE DAILY WITH MEALS 11/5/21  Yes ALECIA August   Tradjenta 5 mg tablet TAKE 1 TABLET BY MOUTH DAILY 11/5/21  Yes ALECIA August   atorvastatin (LIPITOR) 40 mg tablet TAKE 1 TABLET BY MOUTH DAILY 11/3/21  Yes ALECIA August   insulin glargine (LANTUS,BASAGLAR) 100 unit/mL (3 mL) inpn Inject 10 units SQ QHS 8/19/21  Yes ALECIA August   lisinopriL (PRINIVIL, ZESTRIL) 2.5 mg tablet TAKE 1 TABLET BY MOUTH EVERY DAY 8/19/21  Yes ALECIA August   Insulin Needles, Disposable, (BD Ultra-Fine Short Pen Needle) 31 gauge x 5/16\" ndle Us as directed with Lantus Insulin Pen. Dx E11.65, Z79.4 5/14/21  Yes ALECIA August   Insulin Needles, Disposable, (NUBIA PEN NEEDLE) 32 gauge x 5/32\" ndle 1 Each by Does Not Apply route daily.  4/8/19  Yes Zeyad Zayas MD   PROAIR HFA 90 mcg/actuation inhaler  1/19/18  Yes Provider, Historical   glucose blood VI test strips (ONETOUCH VERIO) strip Check BG 2-3 times/day 1/26/18  Yes Alexei Abreu MD   Insulin Needles, Disposable, 31 gauge x 5/16\" ndle by SubCUTAneous route daily. 10/19/17  Yes Alexei Abreu MD          No Known Allergies        Review of Systems   Constitutional: Negative for chills, diaphoresis, fever, malaise/fatigue and weight loss. Eyes: Negative. Cardiovascular: Negative for chest pain, palpitations, orthopnea, claudication and leg swelling. Gastrointestinal: Negative. Genitourinary: Negative. Musculoskeletal: Positive for joint pain (hands-see HPI). Negative for back pain, myalgias and neck pain. Endo/Heme/Allergies: Negative for polydipsia. Psychiatric/Behavioral: Negative for depression. The patient is not nervous/anxious. Objective:   Physical Exam  Vitals reviewed. Constitutional:       General: She is not in acute distress. Appearance: She is well-groomed. Eyes:      General: No scleral icterus. Cardiovascular:      Rate and Rhythm: Normal rate and regular rhythm. Pulses: Normal pulses. Heart sounds: Normal heart sounds. Pulmonary:      Effort: Pulmonary effort is normal. No respiratory distress. Breath sounds: Normal breath sounds. Abdominal:      Palpations: Abdomen is soft. Tenderness: There is no abdominal tenderness. Musculoskeletal:      Right hand: Deformity and tenderness present. No swelling. Decreased range of motion. Normal capillary refill. Normal pulse. Left hand: Deformity and tenderness present. No swelling. Decreased range of motion. Normal capillary refill. Normal pulse. Cervical back: Neck supple. Right lower leg: No edema. Left lower leg: No edema. Skin:     General: Skin is warm and dry. Findings: No rash. Comments: No ulcers. Neurological:      Mental Status: She is alert and oriented to person, place, and time. Sensory: No sensory deficit. Motor: No weakness.       Gait: Gait normal.   Psychiatric:         Mood and Affect: Mood normal. Behavior: Behavior normal. Behavior is cooperative. Visit Vitals  BP (!) 100/50 (BP 1 Location: Right arm, BP Patient Position: Sitting, BP Cuff Size: Adult)   Pulse 76   Temp 97.5 °F (36.4 °C) (Temporal)   Resp 16   Ht 5' 2.25\" (1.581 m)   Wt 111 lb (50.3 kg)   SpO2 99%   BMI 20.14 kg/m²         Disclaimer:  Advised her to call back or return to office if symptoms worsen/change/persist.  Discussed expected course/resolution/complications of diagnosis in detail with patient. Medication risks/benefits/costs/interactions/alternatives discussed with patient. She was given an after visit summary which includes diagnoses, current medications, & vitals. She expressed understanding and agrees with the diagnosis and plan.         ALECIA Kilpatrick

## 2021-11-20 LAB
ALBUMIN SERPL-MCNC: 4.3 G/DL (ref 3.8–4.9)
ALBUMIN/GLOB SERPL: 1.5 {RATIO} (ref 1.2–2.2)
ALP SERPL-CCNC: 68 IU/L (ref 44–121)
ALT SERPL-CCNC: 10 IU/L (ref 0–32)
ANA SER QL: NEGATIVE
AST SERPL-CCNC: 21 IU/L (ref 0–40)
BILIRUB SERPL-MCNC: 0.7 MG/DL (ref 0–1.2)
BUN SERPL-MCNC: 18 MG/DL (ref 6–24)
BUN/CREAT SERPL: 29 (ref 9–23)
CALCIUM SERPL-MCNC: 9.6 MG/DL (ref 8.7–10.2)
CHLORIDE SERPL-SCNC: 105 MMOL/L (ref 96–106)
CO2 SERPL-SCNC: 25 MMOL/L (ref 20–29)
CREAT SERPL-MCNC: 0.62 MG/DL (ref 0.57–1)
EST. AVERAGE GLUCOSE BLD GHB EST-MCNC: 137 MG/DL
GLOBULIN SER CALC-MCNC: 2.8 G/DL (ref 1.5–4.5)
GLUCOSE SERPL-MCNC: 62 MG/DL (ref 65–99)
HBA1C MFR BLD: 6.4 % (ref 4.8–5.6)
POTASSIUM SERPL-SCNC: 5 MMOL/L (ref 3.5–5.2)
PROT SERPL-MCNC: 7.1 G/DL (ref 6–8.5)
RHEUMATOID FACT SERPL-ACNC: <10 IU/ML (ref 0–13.9)
SODIUM SERPL-SCNC: 142 MMOL/L (ref 134–144)

## 2021-12-07 ENCOUNTER — TELEPHONE (OUTPATIENT)
Dept: INTERNAL MEDICINE CLINIC | Age: 61
End: 2021-12-07

## 2021-12-07 NOTE — TELEPHONE ENCOUNTER
----- Message from Isatu Friday sent at 12/7/2021  2:27 PM EST -----  Subject: Message to Provider    QUESTIONS  Information for Provider? PT is wanting to change providers in the same   office. PT is requesting call back. ---------------------------------------------------------------------------  --------------  Gene SafetyCulture INFO  What is the best way for the office to contact you? OK to leave message on   voicemail  Preferred Call Back Phone Number? [phone]  ---------------------------------------------------------------------------  --------------  SCRIPT ANSWERS  Relationship to Patient?  Self

## 2021-12-23 NOTE — ASSESSMENT & PLAN NOTE
Unclear control. Labs ordered for further evaluation. Advised to decrease Lantus insulin to 8 units at HS for now. Continue current doses of Metformin and Tradjenta. Continue low dose Lisinopril for renal protection.

## 2022-03-18 PROBLEM — Z79.4 LONG TERM (CURRENT) USE OF INSULIN (HCC): Status: ACTIVE | Noted: 2021-08-24

## 2022-03-19 PROBLEM — E78.2 MIXED HYPERLIPIDEMIA: Status: ACTIVE | Noted: 2021-05-14

## 2022-03-19 PROBLEM — E11.9 CONTROLLED TYPE 2 DIABETES MELLITUS WITHOUT COMPLICATION, WITH LONG-TERM CURRENT USE OF INSULIN (HCC): Status: ACTIVE | Noted: 2021-05-14

## 2022-03-19 PROBLEM — E78.2 MIXED HYPERLIPIDEMIA: Status: ACTIVE | Noted: 2017-10-19

## 2022-03-19 PROBLEM — Z79.4 CONTROLLED TYPE 2 DIABETES MELLITUS WITHOUT COMPLICATION, WITH LONG-TERM CURRENT USE OF INSULIN (HCC): Status: ACTIVE | Noted: 2021-05-14

## 2022-03-20 PROBLEM — I10 ESSENTIAL HYPERTENSION: Status: ACTIVE | Noted: 2019-01-08

## 2022-03-20 PROBLEM — E11.21 TYPE 2 DIABETES WITH NEPHROPATHY (HCC): Status: ACTIVE | Noted: 2019-04-08

## 2023-08-22 ENCOUNTER — HOSPITAL ENCOUNTER (OUTPATIENT)
Facility: HOSPITAL | Age: 63
Discharge: HOME | End: 2023-08-25
Payer: COMMERCIAL

## 2023-08-22 VITALS — HEIGHT: 62 IN | WEIGHT: 111 LBS | BODY MASS INDEX: 20.43 KG/M2

## 2023-08-22 DIAGNOSIS — Z12.31 VISIT FOR SCREENING MAMMOGRAM: ICD-10-CM

## 2023-08-22 PROCEDURE — 77067 SCR MAMMO BI INCL CAD: CPT

## 2024-08-23 ENCOUNTER — HOSPITAL ENCOUNTER (OUTPATIENT)
Facility: HOSPITAL | Age: 64
Discharge: HOME | End: 2024-08-23
Payer: COMMERCIAL

## 2024-08-23 VITALS — BODY MASS INDEX: 21.16 KG/M2 | WEIGHT: 115 LBS | HEIGHT: 62 IN

## 2024-08-23 DIAGNOSIS — Z12.31 VISIT FOR SCREENING MAMMOGRAM: ICD-10-CM

## 2024-08-23 PROCEDURE — 77063 BREAST TOMOSYNTHESIS BI: CPT

## 2025-07-07 ENCOUNTER — TRANSCRIBE ORDERS (OUTPATIENT)
Facility: HOSPITAL | Age: 65
End: 2025-07-07

## 2025-07-07 DIAGNOSIS — Z12.31 OTHER SCREENING MAMMOGRAM: Primary | ICD-10-CM

## 2025-08-27 ENCOUNTER — HOSPITAL ENCOUNTER (OUTPATIENT)
Facility: HOSPITAL | Age: 65
Discharge: HOME | End: 2025-08-30
Payer: COMMERCIAL

## 2025-08-27 VITALS — BODY MASS INDEX: 21.16 KG/M2 | HEIGHT: 62 IN | WEIGHT: 115 LBS

## 2025-08-27 DIAGNOSIS — Z12.31 OTHER SCREENING MAMMOGRAM: ICD-10-CM

## 2025-08-27 PROCEDURE — 77063 BREAST TOMOSYNTHESIS BI: CPT
